# Patient Record
Sex: MALE | Race: ASIAN | Employment: UNEMPLOYED | URBAN - METROPOLITAN AREA
[De-identification: names, ages, dates, MRNs, and addresses within clinical notes are randomized per-mention and may not be internally consistent; named-entity substitution may affect disease eponyms.]

---

## 2020-12-28 PROBLEM — I63.9 STROKE (HCC): Status: ACTIVE | Noted: 2020-08-22

## 2020-12-28 PROBLEM — I65.09 OCCLUSION OF VERTEBRAL ARTERY: Status: ACTIVE | Noted: 2020-12-28

## 2021-05-10 ENCOUNTER — APPOINTMENT (OUTPATIENT)
Dept: CT IMAGING | Facility: HOSPITAL | Age: 76
End: 2021-05-10
Attending: EMERGENCY MEDICINE
Payer: MEDICAID

## 2021-05-10 ENCOUNTER — APPOINTMENT (OUTPATIENT)
Dept: GENERAL RADIOLOGY | Facility: HOSPITAL | Age: 76
End: 2021-05-10
Attending: EMERGENCY MEDICINE
Payer: MEDICAID

## 2021-05-10 ENCOUNTER — HOSPITAL ENCOUNTER (EMERGENCY)
Facility: HOSPITAL | Age: 76
Discharge: HOME | End: 2021-05-10
Attending: EMERGENCY MEDICINE
Payer: MEDICAID

## 2021-05-10 VITALS
SYSTOLIC BLOOD PRESSURE: 147 MMHG | OXYGEN SATURATION: 97 % | DIASTOLIC BLOOD PRESSURE: 84 MMHG | HEART RATE: 80 BPM | RESPIRATION RATE: 17 BRPM

## 2021-05-10 DIAGNOSIS — R73.9 HYPERGLYCEMIA: Primary | ICD-10-CM

## 2021-05-10 PROCEDURE — 99285 EMERGENCY DEPT VISIT HI MDM: CPT

## 2021-05-10 PROCEDURE — 99284 EMERGENCY DEPT VISIT MOD MDM: CPT

## 2021-05-10 PROCEDURE — 70450 CT HEAD/BRAIN W/O DYE: CPT | Performed by: EMERGENCY MEDICINE

## 2021-05-10 PROCEDURE — 82077 ASSAY SPEC XCP UR&BREATH IA: CPT | Performed by: EMERGENCY MEDICINE

## 2021-05-10 PROCEDURE — 93010 ELECTROCARDIOGRAM REPORT: CPT

## 2021-05-10 PROCEDURE — 80143 DRUG ASSAY ACETAMINOPHEN: CPT | Performed by: EMERGENCY MEDICINE

## 2021-05-10 PROCEDURE — 93005 ELECTROCARDIOGRAM TRACING: CPT

## 2021-05-10 PROCEDURE — 85730 THROMBOPLASTIN TIME PARTIAL: CPT | Performed by: EMERGENCY MEDICINE

## 2021-05-10 PROCEDURE — 85025 COMPLETE CBC W/AUTO DIFF WBC: CPT | Performed by: EMERGENCY MEDICINE

## 2021-05-10 PROCEDURE — 80179 DRUG ASSAY SALICYLATE: CPT | Performed by: EMERGENCY MEDICINE

## 2021-05-10 PROCEDURE — 82962 GLUCOSE BLOOD TEST: CPT

## 2021-05-10 PROCEDURE — 96360 HYDRATION IV INFUSION INIT: CPT

## 2021-05-10 PROCEDURE — 85610 PROTHROMBIN TIME: CPT | Performed by: EMERGENCY MEDICINE

## 2021-05-10 PROCEDURE — 80053 COMPREHEN METABOLIC PANEL: CPT | Performed by: EMERGENCY MEDICINE

## 2021-05-10 RX ORDER — INSULIN ASPART 100 [IU]/ML
0.15 INJECTION, SOLUTION INTRAVENOUS; SUBCUTANEOUS ONCE
Status: COMPLETED | OUTPATIENT
Start: 2021-05-10 | End: 2021-05-10

## 2021-05-10 NOTE — ED PROVIDER NOTES
Patient Seen in: [facility] Emergency Department      History   Patient presents with:  Altered Mental Status    Stated Complaint: AMS    HPI/Subjective:   HPI    35-year-old [place] male here for altered mental status the patient was found wandering Physical Exam    Patient is alert he is oriented x2. No acute distress  HEENT exam there is some right-sided facial droop. Speech is somewhat slurred but again this might be partially due to his accent.   The pupils are equal round react light ext components:    HGB 12.9 (*)     HCT 38.8 (*)     Neutrophil Absolute Prelim 8.65 (*)     Neutrophil Absolute 8.65 (*)     Lymphocyte Absolute 0.98 (*)     All other components within normal limits   PROTHROMBIN TIME (PT) - Normal   RAPID SARS-COV-2 BY PCR 6:57 PM

## 2021-05-10 NOTE — ED INITIAL ASSESSMENT (HPI)
Pt was found wondering down [facility]. Pt was picked up by a  and taken to the [address] where they assessed him and stated that he was slurring his speech and had the smell of ETOH. PT denies any ETOH.

## 2021-07-29 PROBLEM — H93.299 ABNORMAL AUDITORY PERCEPTION, UNSPECIFIED LATERALITY: Status: ACTIVE | Noted: 2021-07-29

## 2021-07-29 PROBLEM — H61.23 IMPACTED CERUMEN, BILATERAL: Status: ACTIVE | Noted: 2021-07-29

## 2024-08-12 ENCOUNTER — APPOINTMENT (OUTPATIENT)
Dept: CT IMAGING | Facility: HOSPITAL | Age: 79
End: 2024-08-12
Attending: EMERGENCY MEDICINE
Payer: MEDICAID

## 2024-08-12 ENCOUNTER — APPOINTMENT (OUTPATIENT)
Dept: GENERAL RADIOLOGY | Facility: HOSPITAL | Age: 79
End: 2024-08-12
Attending: EMERGENCY MEDICINE
Payer: MEDICAID

## 2024-08-12 ENCOUNTER — HOSPITAL ENCOUNTER (INPATIENT)
Facility: HOSPITAL | Age: 79
LOS: 4 days | Discharge: NURSING, REHAB OR LONG-TERM CARE | End: 2024-08-16
Attending: EMERGENCY MEDICINE | Admitting: INTERNAL MEDICINE
Payer: MEDICAID

## 2024-08-12 DIAGNOSIS — E87.20 LACTIC ACIDOSIS: ICD-10-CM

## 2024-08-12 DIAGNOSIS — R19.7 DIARRHEA, UNSPECIFIED TYPE: ICD-10-CM

## 2024-08-12 DIAGNOSIS — S06.5XAA ACUTE SUBDURAL HEMATOMA (HCC): ICD-10-CM

## 2024-08-12 DIAGNOSIS — I60.9 SUBARACHNOID HEMORRHAGE (HCC): Primary | ICD-10-CM

## 2024-08-12 DIAGNOSIS — R00.0 SINUS TACHYCARDIA: ICD-10-CM

## 2024-08-12 DIAGNOSIS — R73.9 HYPERGLYCEMIA: ICD-10-CM

## 2024-08-12 PROBLEM — E78.5 HYPERLIPIDEMIA: Status: ACTIVE | Noted: 2024-08-12

## 2024-08-12 PROBLEM — I69.328 HEMIPARESIS AND SPEECH AND LANGUAGE DEFICIT AS LATE EFFECT OF CEREBROVASCULAR ACCIDENT (CVA) (HCC): Status: ACTIVE | Noted: 2024-08-12

## 2024-08-12 PROBLEM — E11.9 TYPE 2 DIABETES MELLITUS WITHOUT COMPLICATION, WITHOUT LONG-TERM CURRENT USE OF INSULIN (HCC): Status: ACTIVE | Noted: 2024-08-12

## 2024-08-12 PROBLEM — I69.359 HEMIPARESIS AND SPEECH AND LANGUAGE DEFICIT AS LATE EFFECT OF CEREBROVASCULAR ACCIDENT (CVA) (HCC): Status: ACTIVE | Noted: 2024-08-12

## 2024-08-12 LAB
ALBUMIN SERPL-MCNC: 5.4 G/DL (ref 3.2–4.8)
ALBUMIN/GLOB SERPL: 1.6 {RATIO} (ref 1–2)
ALP LIVER SERPL-CCNC: 60 U/L
ALT SERPL-CCNC: 13 U/L
ANION GAP SERPL CALC-SCNC: 11 MMOL/L (ref 0–18)
APTT PPP: 23.7 SECONDS (ref 23–36)
AST SERPL-CCNC: 15 U/L (ref ?–34)
ATRIAL RATE: 121 BPM
BASE EXCESS BLDV CALC-SCNC: 1 MMOL/L
BASOPHILS # BLD AUTO: 0.03 X10(3) UL (ref 0–0.2)
BASOPHILS NFR BLD AUTO: 0.2 %
BILIRUB SERPL-MCNC: 1.2 MG/DL (ref 0.2–1.1)
BILIRUB UR QL STRIP.AUTO: NEGATIVE
BUN BLD-MCNC: 23 MG/DL (ref 9–23)
CALCIUM BLD-MCNC: 11.8 MG/DL (ref 8.7–10.4)
CHLORIDE SERPL-SCNC: 107 MMOL/L (ref 98–112)
CHOLEST SERPL-MCNC: 241 MG/DL (ref ?–200)
CLARITY UR REFRACT.AUTO: CLEAR
CO2 SERPL-SCNC: 25 MMOL/L (ref 21–32)
CREAT BLD-MCNC: 1.61 MG/DL
EGFRCR SERPLBLD CKD-EPI 2021: 43 ML/MIN/1.73M2 (ref 60–?)
EOSINOPHIL # BLD AUTO: 0 X10(3) UL (ref 0–0.7)
EOSINOPHIL NFR BLD AUTO: 0 %
ERYTHROCYTE [DISTWIDTH] IN BLOOD BY AUTOMATED COUNT: 12.3 %
GLOBULIN PLAS-MCNC: 3.4 G/DL (ref 2–3.5)
GLUCOSE BLD-MCNC: 263 MG/DL (ref 70–99)
GLUCOSE BLD-MCNC: 349 MG/DL (ref 70–99)
GLUCOSE BLD-MCNC: 364 MG/DL (ref 70–99)
GLUCOSE UR STRIP.AUTO-MCNC: NORMAL MG/DL
HCO3 BLDV-SCNC: 25.4 MEQ/L (ref 22–26)
HCT VFR BLD AUTO: 51.6 %
HDLC SERPL-MCNC: 74 MG/DL (ref 40–59)
HGB BLD-MCNC: 15.9 G/DL
IMM GRANULOCYTES # BLD AUTO: 0.04 X10(3) UL (ref 0–1)
IMM GRANULOCYTES NFR BLD: 0.3 %
INR BLD: 1.01 (ref 0.8–1.2)
KETONES UR STRIP.AUTO-MCNC: NEGATIVE MG/DL
LACTATE BLD-SCNC: 4 MMOL/L (ref 0.5–2)
LDLC SERPL CALC-MCNC: 147 MG/DL (ref ?–100)
LEUKOCYTE ESTERASE UR QL STRIP.AUTO: NEGATIVE
LYMPHOCYTES # BLD AUTO: 0.33 X10(3) UL (ref 1–4)
LYMPHOCYTES NFR BLD AUTO: 2.1 %
MAGNESIUM SERPL-MCNC: 2.2 MG/DL (ref 1.6–2.6)
MCH RBC QN AUTO: 32.6 PG (ref 26–34)
MCHC RBC AUTO-ENTMCNC: 30.8 G/DL (ref 31–37)
MCV RBC AUTO: 106 FL
MONOCYTES # BLD AUTO: 0.72 X10(3) UL (ref 0.1–1)
MONOCYTES NFR BLD AUTO: 4.5 %
NEUTROPHILS # BLD AUTO: 14.79 X10 (3) UL (ref 1.5–7.7)
NEUTROPHILS # BLD AUTO: 14.79 X10(3) UL (ref 1.5–7.7)
NEUTROPHILS NFR BLD AUTO: 92.9 %
NITRITE UR QL STRIP.AUTO: NEGATIVE
NONHDLC SERPL-MCNC: 167 MG/DL (ref ?–130)
NT-PROBNP SERPL-MCNC: 3801 PG/ML (ref ?–450)
OSMOLALITY SERPL CALC.SUM OF ELEC: 314 MOSM/KG (ref 275–295)
OXYHGB MFR BLDV: 84.2 % (ref 72–78)
P AXIS: 46 DEGREES
P-R INTERVAL: 160 MS
PCO2 BLDV: 38 MM HG (ref 38–50)
PH BLDV: 7.43 [PH] (ref 7.33–7.43)
PH UR STRIP.AUTO: 5.5 [PH] (ref 5–8)
PLATELET # BLD AUTO: 252 10(3)UL (ref 150–450)
PO2 BLDV: 54 MM HG (ref 30–50)
POTASSIUM SERPL-SCNC: 4.1 MMOL/L (ref 3.5–5.1)
PROT SERPL-MCNC: 8.8 G/DL (ref 5.7–8.2)
PROT UR STRIP.AUTO-MCNC: NEGATIVE MG/DL
PROTHROMBIN TIME: 13.3 SECONDS (ref 11.6–14.8)
Q-T INTERVAL: 308 MS
QRS DURATION: 104 MS
QTC CALCULATION (BEZET): 437 MS
R AXIS: -45 DEGREES
RBC # BLD AUTO: 4.87 X10(6)UL
RBC UR QL AUTO: NEGATIVE
SARS-COV-2 RNA RESP QL NAA+PROBE: NOT DETECTED
SODIUM SERPL-SCNC: 143 MMOL/L (ref 136–145)
SP GR UR STRIP.AUTO: 1.03 (ref 1–1.03)
T AXIS: 106 DEGREES
TRIGL SERPL-MCNC: 117 MG/DL (ref 30–149)
TROPONIN I SERPL HS-MCNC: 62 NG/L
TSI SER-ACNC: 1.52 MIU/ML (ref 0.55–4.78)
UROBILINOGEN UR STRIP.AUTO-MCNC: NORMAL MG/DL
VENTRICULAR RATE: 121 BPM
VLDLC SERPL CALC-MCNC: 22 MG/DL (ref 0–30)
WBC # BLD AUTO: 15.9 X10(3) UL (ref 4–11)

## 2024-08-12 PROCEDURE — 74177 CT ABD & PELVIS W/CONTRAST: CPT | Performed by: EMERGENCY MEDICINE

## 2024-08-12 PROCEDURE — 70450 CT HEAD/BRAIN W/O DYE: CPT | Performed by: EMERGENCY MEDICINE

## 2024-08-12 PROCEDURE — 70498 CT ANGIOGRAPHY NECK: CPT | Performed by: EMERGENCY MEDICINE

## 2024-08-12 PROCEDURE — 71045 X-RAY EXAM CHEST 1 VIEW: CPT | Performed by: EMERGENCY MEDICINE

## 2024-08-12 PROCEDURE — 99291 CRITICAL CARE FIRST HOUR: CPT | Performed by: NURSE PRACTITIONER

## 2024-08-12 PROCEDURE — 70496 CT ANGIOGRAPHY HEAD: CPT | Performed by: EMERGENCY MEDICINE

## 2024-08-12 RX ORDER — DEXTROSE MONOHYDRATE 25 G/50ML
50 INJECTION, SOLUTION INTRAVENOUS
Status: DISCONTINUED | OUTPATIENT
Start: 2024-08-12 | End: 2024-08-16

## 2024-08-12 RX ORDER — SODIUM CHLORIDE 9 MG/ML
INJECTION, SOLUTION INTRAVENOUS CONTINUOUS
Status: DISCONTINUED | OUTPATIENT
Start: 2024-08-12 | End: 2024-08-13

## 2024-08-12 RX ORDER — METOCLOPRAMIDE HYDROCHLORIDE 5 MG/ML
10 INJECTION INTRAMUSCULAR; INTRAVENOUS EVERY 8 HOURS PRN
Status: DISCONTINUED | OUTPATIENT
Start: 2024-08-12 | End: 2024-08-12

## 2024-08-12 RX ORDER — LABETALOL HYDROCHLORIDE 5 MG/ML
10 INJECTION, SOLUTION INTRAVENOUS ONCE
Status: COMPLETED | OUTPATIENT
Start: 2024-08-12 | End: 2024-08-12

## 2024-08-12 RX ORDER — ONDANSETRON 2 MG/ML
4 INJECTION INTRAMUSCULAR; INTRAVENOUS EVERY 6 HOURS PRN
Status: DISCONTINUED | OUTPATIENT
Start: 2024-08-12 | End: 2024-08-12

## 2024-08-12 RX ORDER — HYDRALAZINE HYDROCHLORIDE 20 MG/ML
10 INJECTION INTRAMUSCULAR; INTRAVENOUS EVERY 2 HOUR PRN
Status: DISCONTINUED | OUTPATIENT
Start: 2024-08-12 | End: 2024-08-13

## 2024-08-12 RX ORDER — ACETAMINOPHEN 650 MG/1
650 SUPPOSITORY RECTAL EVERY 4 HOURS PRN
Status: DISCONTINUED | OUTPATIENT
Start: 2024-08-12 | End: 2024-08-13

## 2024-08-12 RX ORDER — LOSARTAN POTASSIUM 50 MG/1
50 TABLET ORAL DAILY
Status: ON HOLD | COMMUNITY
End: 2024-08-16

## 2024-08-12 RX ORDER — ONDANSETRON 2 MG/ML
4 INJECTION INTRAMUSCULAR; INTRAVENOUS EVERY 6 HOURS PRN
Status: DISCONTINUED | OUTPATIENT
Start: 2024-08-12 | End: 2024-08-13

## 2024-08-12 RX ORDER — MORPHINE SULFATE 4 MG/ML
4 INJECTION, SOLUTION INTRAMUSCULAR; INTRAVENOUS EVERY 2 HOUR PRN
Status: DISCONTINUED | OUTPATIENT
Start: 2024-08-12 | End: 2024-08-16

## 2024-08-12 RX ORDER — MORPHINE SULFATE 2 MG/ML
2 INJECTION, SOLUTION INTRAMUSCULAR; INTRAVENOUS EVERY 2 HOUR PRN
Status: DISCONTINUED | OUTPATIENT
Start: 2024-08-12 | End: 2024-08-16

## 2024-08-12 RX ORDER — HYDROCODONE BITARTRATE AND ACETAMINOPHEN 5; 325 MG/1; MG/1
1 TABLET ORAL EVERY 4 HOURS PRN
Status: DISCONTINUED | OUTPATIENT
Start: 2024-08-12 | End: 2024-08-13

## 2024-08-12 RX ORDER — ACETAMINOPHEN 325 MG/1
650 TABLET ORAL EVERY 4 HOURS PRN
Status: DISCONTINUED | OUTPATIENT
Start: 2024-08-12 | End: 2024-08-13

## 2024-08-12 RX ORDER — LABETALOL HYDROCHLORIDE 5 MG/ML
10 INJECTION, SOLUTION INTRAVENOUS EVERY 10 MIN PRN
Status: DISCONTINUED | OUTPATIENT
Start: 2024-08-12 | End: 2024-08-13

## 2024-08-12 RX ORDER — NICOTINE POLACRILEX 4 MG
30 LOZENGE BUCCAL
Status: DISCONTINUED | OUTPATIENT
Start: 2024-08-12 | End: 2024-08-16

## 2024-08-12 RX ORDER — NICOTINE POLACRILEX 4 MG
15 LOZENGE BUCCAL
Status: DISCONTINUED | OUTPATIENT
Start: 2024-08-12 | End: 2024-08-16

## 2024-08-12 RX ORDER — DONEPEZIL HYDROCHLORIDE 10 MG/1
10 TABLET, FILM COATED ORAL NIGHTLY
COMMUNITY

## 2024-08-12 RX ORDER — METOCLOPRAMIDE HYDROCHLORIDE 5 MG/ML
5 INJECTION INTRAMUSCULAR; INTRAVENOUS EVERY 8 HOURS PRN
Status: DISCONTINUED | OUTPATIENT
Start: 2024-08-12 | End: 2024-08-13

## 2024-08-12 RX ORDER — MORPHINE SULFATE 2 MG/ML
1 INJECTION, SOLUTION INTRAMUSCULAR; INTRAVENOUS EVERY 2 HOUR PRN
Status: DISCONTINUED | OUTPATIENT
Start: 2024-08-12 | End: 2024-08-16

## 2024-08-12 RX ORDER — CLOPIDOGREL BISULFATE 75 MG/1
75 TABLET ORAL
COMMUNITY
End: 2024-08-16

## 2024-08-12 RX ORDER — HYDROCODONE BITARTRATE AND ACETAMINOPHEN 5; 325 MG/1; MG/1
2 TABLET ORAL EVERY 4 HOURS PRN
Status: DISCONTINUED | OUTPATIENT
Start: 2024-08-12 | End: 2024-08-13

## 2024-08-12 RX ORDER — LEVETIRACETAM 500 MG/5ML
500 INJECTION, SOLUTION, CONCENTRATE INTRAVENOUS EVERY 12 HOURS
Status: DISCONTINUED | OUTPATIENT
Start: 2024-08-12 | End: 2024-08-14

## 2024-08-12 NOTE — ED QUICK NOTES
Pt family states that pt right facial droop and weakness is due to prior stroke. Family states that this morning, pt seemed more weak than usual and did not want to even try to get out of bed. Family states that pt was speaking normally and answering questions normally. Currently, family states that pt continually has normal mentation and cognition at this time. They state that pt seems more alert after fluids. This RN has not attempted to ambulate pt due to family stating weakness.

## 2024-08-12 NOTE — ED INITIAL ASSESSMENT (HPI)
Pt presents to ED via EMS from home with c/o increased weakness. Pt has hx of prior stroke and has left side deficit of weakness. Per Ems, family stated that pt can usually walk with assist, but not today. Family state that pt is answering questions correctly, but is more weak than usual.

## 2024-08-12 NOTE — ED PROVIDER NOTES
Patient Seen in: [facility] Emergency Department      History     Chief Complaint   Patient presents with    Fatigue     Increased weakness for the last hour     Stated Complaint: weakness    Subjective:   79-year-old male, history of type 2 diabetes, on metformin, history of CVA with left-sided weakness, presents with multiple family members with complaints of diarrhea and weakness and hyperglycemia and some lethargy.  Wife states that he usually cheats at night and gets up and goes and is a bunch of sweets.  States he then usually has diarrhea in the morning which not unusual.  States he usually sleeps throughout most of the morning he did that today when he tried to get out of bed he had some diarrhea and could not stand on his own.  They said he was acting fine yesterday, had a late lunch so did not eat dinner and they suspected a gap in the middle wanted to eat some sweets.  Patient has some mild dysarthria at baseline.  They state he is at his baseline.  He is oriented to person place time and situation.  He denies any pain.  He is tachycardic upon arrival.  Denies any headache or blurred vision or dizziness or neck pain or chest pain or cough or shortness of breath.  No abdominal pain, back pain, GI bleeding, numbness or acute focal weakness.  Not on any blood thinners.  No falls or trauma.  Very poor historian so most information obtained from his family            Objective:   No pertinent past medical history.            No pertinent past surgical history.              No pertinent social history.            Review of Systems   Unable to perform ROS: Dementia       Positive for stated Chief Complaint: Fatigue (Increased weakness for the last hour)    Other systems are as noted in HPI.  Constitutional and vital signs reviewed.      All other systems reviewed and negative except as noted above.    Physical Exam     ED Triage Vitals [08/12/24 1553]   BP (!) 156/95   Pulse (!) 132   Resp 23   Temp 97.6  °F (36.4 °C)   Temp src Oral   SpO2 95 %   O2 Device None (Room air)       Current Vitals:   Vital Signs  BP: 155/85  Pulse: 87  Resp: 25  Temp: 97.4 °F (36.3 °C)  Temp src: Temporal  MAP (mmHg): (!) 105    Oxygen Therapy  SpO2: 98 %  O2 Device: None (Room air)            Physical Exam  Vitals and nursing note reviewed.   Constitutional:       General: He is not in acute distress.     Appearance: He is not diaphoretic.   HENT:      Head: Normocephalic.      Nose: Nose normal.      Mouth/Throat:      Mouth: Mucous membranes are moist.   Eyes:      Pupils: Pupils are equal, round, and reactive to light.   Cardiovascular:      Rate and Rhythm: Regular rhythm. Tachycardia present.      Pulses: Normal pulses.   Pulmonary:      Effort: Pulmonary effort is normal. No respiratory distress.      Breath sounds: Normal breath sounds.   Abdominal:      General: There is no distension.      Palpations: Abdomen is soft.      Tenderness: There is no abdominal tenderness.   Musculoskeletal:      Cervical back: Neck supple.   Skin:     General: Skin is warm and dry.   Neurological:      General: No focal deficit present.      Mental Status: He is alert.               ED Course     Labs Reviewed   COMP METABOLIC PANEL (14) - Abnormal; Notable for the following components:       Result Value    Glucose 364 (*)     Creatinine 1.61 (*)     Calcium, Total 11.8 (*)     Calculated Osmolality 314 (*)     eGFR-Cr 43 (*)     Bilirubin, Total 1.2 (*)     Total Protein 8.8 (*)     Albumin 5.4 (*)     All other components within normal limits   CBC WITH DIFFERENTIAL WITH PLATELET - Abnormal; Notable for the following components:    WBC 15.9 (*)     .0 (*)     MCHC 30.8 (*)     Neutrophil Absolute Prelim 14.79 (*)     Neutrophil Absolute 14.79 (*)     Lymphocyte Absolute 0.33 (*)     All other components within normal limits   TROPONIN I HIGH SENSITIVITY - Abnormal; Notable for the following components:    Troponin I (High Sensitivity) 62  (*)     All other components within normal limits   PRO BETA NATRIURETIC PEPTIDE - Abnormal; Notable for the following components:    Pro-Beta Natriuretic Peptide 3,801 (*)     All other components within normal limits   VENOUS BLOOD GAS - Abnormal; Notable for the following components:    Venous pO2 54 (*)     Venous O2Hb 84.2 (*)     All other components within normal limits   LACTIC ACID RESPIRATORY - Abnormal; Notable for the following components:    Lactic Acid (Blood Gas) 4.0 (*)     All other components within normal limits   LIPID PANEL - Abnormal; Notable for the following components:    Cholesterol, Total 241 (*)     HDL Cholesterol 74 (*)     LDL Cholesterol 147 (*)     Non HDL Chol 167 (*)     All other components within normal limits   POCT GLUCOSE - Abnormal; Notable for the following components:    POC Glucose 349 (*)     All other components within normal limits   POCT GLUCOSE - Abnormal; Notable for the following components:    POC Glucose 263 (*)     All other components within normal limits   PROTHROMBIN TIME (PT) - Normal   MAGNESIUM - Normal   TSH W REFLEX TO FREE T4 - Normal   PTT, ACTIVATED - Normal   RAPID SARS-COV-2 BY PCR - Normal   URINALYSIS WITH CULTURE REFLEX   RAINBOW DRAW LAVENDER   RAINBOW DRAW LIGHT GREEN   RAINBOW DRAW BLUE   RAINBOW DRAW GOLD   BLOOD CULTURE   BLOOD CULTURE   MRSA SCREEN BY PCR     EKG    Rate, intervals and axes as noted on EKG Report.  Rate: 121  Rhythm: Sinus Rhythm  Reading: EKG sinus tachycardia 121 bpm.  Left axis deviation.  No ST elevations.  , ,  ms.  When compared to May 2021, no significant changes are noted.  He is tachycardic today.  I do not appreciate the deep inverted T waves that were there previously in lead III.    Patient placed on cardiac monitor for telemetry monitoring secondary to weakness, diarrhea, tachcyardia. Interpretation at bedside by me is sinus tachycardia.                ED Course as of 08/12/24  2230  ------------------------------------------------------------  Time: 08/12 1822  Comment: Spoke with neurosurgery Dr Guthrie, states get CTA now, admit to CNICU and then rpt CT brain at 0500 tomorrow              MDM      CTA BRAIN + CTA CAROTIDS (CPT=70496/03059)    Result Date: 8/12/2024  CONCLUSION:   1. Stable mild to moderate scattered subarachnoid hemorrhage most pronounced along the anterior inferior right frontal sulci, left parietal sulci, and minimally along the right sylvian fissure.   2. Stable thin subdural hematoma along the anterior right frontal lobe measuring up to 2 mm in thickness.  3. Stable mild chronic microvascular ischemic changes in the cerebral white matter.  If there is clinical concern for acute ischemia/infarction, an MRI of the brain would be recommended for further evaluation.  4. There is moderate atherosclerotic irregularity and narrowing in the M1 and M2 branches of the right middle cerebral artery.  There is mild-to-moderate atherosclerotic irregularity and narrowing in the distal M1 and proximal M2 branches of the left middle cerebral artery.   5. There is moderate atherosclerotic irregularity and narrowing at the origin of the right vertebral artery due to mixed atherosclerotic plaque.  There is scattered mild plaque along the course of the right cervical vertebral artery with approximately 50% narrowing in the distal V2 segment noted.  There is chronic appearing occlusion of the V3 segment extending into the V4 segment of the right vertebral artery.  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   6. There is severe atherosclerotic narrowing in the V1 segment of the left vertebral artery due to mixed predominately noncalcified atherosclerotic plaque.  There is mild-to-moderate scattered atherosclerotic plaque along the course of the V4 segment of  the left vertebral artery without hemodynamically significant narrowing.  7. There is approximately 50% stenosis in the distal supraclinoid segment of the right internal carotid artery due to mixed plaque.   8. There is ectasia of the ascending aorta measuring up to 4 cm in diameter which could be further assessed with gated CTA of the thoracic aorta as clinically directed.  9. No evidence of occlusion, dissection, or flow-limiting stenosis in the cervical carotid arteries. No evidence of hemodynamically significant carotid stenosis by NASCET criteria.  Please see above for further details.  LOCATION:  [id number]   Dictated by (CST): Joseph Domínguez MD on 8/12/2024 at 8:06 PM     Finalized by (CST): Joseph Domínguez MD on 8/12/2024 at 8:21 PM       CT ABDOMEN+PELVIS(CONTRAST ONLY)(CPT=74177)    Result Date: 8/12/2024  CONCLUSION:  1. Specific etiology for infection and lactic acidosis is not detected on this study. 2. Severe aortic atherosclerosis with plaque ulceration is noted.  There is no specific stenosis detected. 3. Density in the dependent part of the gallbladder could represent noncalcified cholelithiasis or gallbladder sludge. 4. Bilateral nonobstructing nephrolithiasis. 5. Dependent bladder calcifications are noted. 6. Reticular densities with possible traction bronchiectasis in lower lungs could represent interstitial lung disease or be in part related to dependent atelectasis.  If indicated follow-up high-resolution chest CT would be more specific.    LOCATION:  [facility]   Dictated by ([facility]): Saleem Harrison MD on 8/12/2024 at 7:38 PM     Finalized by (CST): Saleem Harrison MD on 8/12/2024 at 7:43 PM       CT BRAIN OR HEAD (CPT=70450)    Result Date: 8/12/2024  CONCLUSION:   1. There is mild to moderate scattered subarachnoid hemorrhage most pronounced along the anterior inferior right frontal sulci, left parietal sulci, and minimally along the right sylvian fissure.  CTA of the head is suggested for further  evaluation.  2. Thin subdural hematoma along the anterior right frontal lobe measuring up to 2 mm in thickness.  3. Stable mild chronic microvascular ischemic changes in the cerebral white matter.  If there is clinical concern for acute ischemia/infarction, an MRI of the brain would be recommended for further evaluation.  Critical results were discussed with Dr. Torres at 1733 hours on 8/12/2024. Critical results were read back.  LOCATION:  [id number]   Dictated by (CST): Joseph Domínguez MD on 8/12/2024 at 5:30 PM     Finalized by (CST): Joseph Domínguez MD on 8/12/2024 at 5:36 PM       XR CHEST AP PORTABLE  (CPT=71045)    Result Date: 8/12/2024  CONCLUSION:  No lobar pneumonia or overt congestive failure.  Mild scarring/atelectasis in the lower lungs.   LOCATION:  [id number]      Dictated by (CST): Joseph Domínguez MD on 8/12/2024 at 5:00 PM     Finalized by (CST): Joseph Domínguez MD on 8/12/2024 at 5:01 PM          I independently interpreted the CT the brain and note the scattered subarachnoid hemorrhage    Differential diagnosis includes, but not limited to, bacterial infection, hypoglycemia, dehydration, viral syndrome, DKA, electrolyte disturbance, trauma    Family at bedside helpful to provide information on the history presenting illness    External chart review demonstrates internal medicine visit in May of this year with duly PCP    CRITICAL CARE:  A total of 105 minutes of critical care time (exclusive of billable procedures) was administered to manage the patient's critical lab values, critical imaging findings, cardiovascular instability, neurologic instability, and metabolic instability due to his subarachnoid and subdural hemorrhages, altered mental status, tachycardia with lactic acidosis suspicious for infection.  Discussion with neurointensivist, neurosurgeon, hospitalist, patient, multiple family members.  Chart review, dietitian, imaging reviewed interpretation.  Frequent neurochecks.  Frequent telemetry  monitoring secondary to sinus tachycardia.  Evaluation for infection.  Managing patient in ED until ICU bed becomes available.  This involved direct patient intervention, complex decision making, and/or extensive discussions with the patient, family, and clinical staff.    79-year-old male presents with some generalized weakness and slight confusion today.  Limited based upon his language barrier as well as baseline dementia even with  and family present.  Does have scattered subarachnoid and some subdural hemorrhages on CT.  No obvious signs of trauma.  No reports of falls though he does get up in the middle the night to get sweets because of his diabetes.  He was hyperglycemic and tachycardic upon arrival.  Lactic acidosis.  Unsure of the etiology.  Could be the underlying tachycardia but that etiology is unclear as well.  Heart rate improved with fluid hydration.  Got a sepsis bolus.  No clear signs of infection.  No obvious pneumonia.  No obvious UTI.  When he went back to CT for his angiogram of his head and his neck after the initial CT findings, I added on his abdomen pelvis to rule out significant infection and the cause of lactic acidosis which is not clear.  Had some small amount of diarrhea in the past couple of days.  Family states is not unusual when he is hyperglycemic.  Currently resting in no distress.  Blood pressure and heart are stable.  Admitted to see NICU.  Neurosurgery questing repeat head CT at 5 AM.  Also discussed with neurointensivist.  Recommend we get neuro IR on board.  Paged, no callback as of yet.  CTA was reviewed, no signs of obvious aneurysm or dissection.  Multiple incidental findings.  Family is updated.  Awaiting bed assignment          Admission disposition: 8/12/2024  8:47 PM                                     [facility]   part of [facility]      Sepsis Reassessment Note    /85 (BP Location: Left arm)   Pulse 87   Temp 97.4 °F (36.3 °C) (Temporal)    Resp 25   Wt 59.8 kg   SpO2 98%   BMI 19.47 kg/m²      I completed the sepsis reassessment at 2030    Cardiac:  Regularity: Regular  Rate: Tachycardic  Heart Sounds: S1,S2    Lungs:   Right: Clear  Left: Clear    Peripheral Pulses:  Radial: Right 1+ or Left 1+      Capillary Refill:  <3 Secs    Skin:  Temp/Moisture: Warm and Dry  Color: Normal      Sam Torres DO  8/12/2024  10:25 PM      Medical Decision Making      Disposition and Plan     Clinical Impression:  1. Subarachnoid hemorrhage (HCC)    2. Acute subdural hematoma (HCC)    3. Diarrhea, unspecified type    4. Hyperglycemia    5. Lactic acidosis    6. Sinus tachycardia         Disposition:  Admit  8/12/2024  8:47 pm    Follow-up:  No follow-up provider specified.        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

## 2024-08-13 ENCOUNTER — APPOINTMENT (OUTPATIENT)
Dept: MRI IMAGING | Facility: HOSPITAL | Age: 79
End: 2024-08-13
Attending: NURSE PRACTITIONER
Payer: MEDICAID

## 2024-08-13 ENCOUNTER — APPOINTMENT (OUTPATIENT)
Dept: CT IMAGING | Facility: HOSPITAL | Age: 79
End: 2024-08-13
Attending: NURSE PRACTITIONER
Payer: MEDICAID

## 2024-08-13 LAB
ANION GAP SERPL CALC-SCNC: 10 MMOL/L (ref 0–18)
BASOPHILS # BLD AUTO: 0.02 X10(3) UL (ref 0–0.2)
BASOPHILS NFR BLD AUTO: 0.1 %
BUN BLD-MCNC: 15 MG/DL (ref 9–23)
CALCIUM BLD-MCNC: 10.4 MG/DL (ref 8.7–10.4)
CHLORIDE SERPL-SCNC: 119 MMOL/L (ref 98–112)
CO2 SERPL-SCNC: 22 MMOL/L (ref 21–32)
CREAT BLD-MCNC: 1.17 MG/DL
EGFRCR SERPLBLD CKD-EPI 2021: 63 ML/MIN/1.73M2 (ref 60–?)
EOSINOPHIL # BLD AUTO: 0 X10(3) UL (ref 0–0.7)
EOSINOPHIL NFR BLD AUTO: 0 %
ERYTHROCYTE [DISTWIDTH] IN BLOOD BY AUTOMATED COUNT: 12.6 %
GLUCOSE BLD-MCNC: 181 MG/DL (ref 70–99)
GLUCOSE BLD-MCNC: 198 MG/DL (ref 70–99)
GLUCOSE BLD-MCNC: 207 MG/DL (ref 70–99)
GLUCOSE BLD-MCNC: 246 MG/DL (ref 70–99)
HCT VFR BLD AUTO: 44.4 %
HGB BLD-MCNC: 14.2 G/DL
IMM GRANULOCYTES # BLD AUTO: 0.05 X10(3) UL (ref 0–1)
IMM GRANULOCYTES NFR BLD: 0.3 %
LYMPHOCYTES # BLD AUTO: 0.56 X10(3) UL (ref 1–4)
LYMPHOCYTES NFR BLD AUTO: 3.5 %
MCH RBC QN AUTO: 32.2 PG (ref 26–34)
MCHC RBC AUTO-ENTMCNC: 32 G/DL (ref 31–37)
MCV RBC AUTO: 100.7 FL
MONOCYTES # BLD AUTO: 0.61 X10(3) UL (ref 0.1–1)
MONOCYTES NFR BLD AUTO: 3.8 %
MRSA DNA SPEC QL NAA+PROBE: NEGATIVE
NEUTROPHILS # BLD AUTO: 14.9 X10 (3) UL (ref 1.5–7.7)
NEUTROPHILS # BLD AUTO: 14.9 X10(3) UL (ref 1.5–7.7)
NEUTROPHILS NFR BLD AUTO: 92.3 %
OSMOLALITY SERPL CALC.SUM OF ELEC: 319 MOSM/KG (ref 275–295)
PLATELET # BLD AUTO: 199 10(3)UL (ref 150–450)
POTASSIUM SERPL-SCNC: 4 MMOL/L (ref 3.5–5.1)
RBC # BLD AUTO: 4.41 X10(6)UL
SODIUM SERPL-SCNC: 151 MMOL/L (ref 136–145)
WBC # BLD AUTO: 16.1 X10(3) UL (ref 4–11)

## 2024-08-13 PROCEDURE — 99292 CRITICAL CARE ADDL 30 MIN: CPT | Performed by: INTERNAL MEDICINE

## 2024-08-13 PROCEDURE — 70450 CT HEAD/BRAIN W/O DYE: CPT | Performed by: NURSE PRACTITIONER

## 2024-08-13 PROCEDURE — 70546 MR ANGIOGRAPH HEAD W/O&W/DYE: CPT | Performed by: NURSE PRACTITIONER

## 2024-08-13 PROCEDURE — 99291 CRITICAL CARE FIRST HOUR: CPT | Performed by: NEUROLOGICAL SURGERY

## 2024-08-13 PROCEDURE — 70553 MRI BRAIN STEM W/O & W/DYE: CPT | Performed by: NURSE PRACTITIONER

## 2024-08-13 PROCEDURE — 99291 CRITICAL CARE FIRST HOUR: CPT | Performed by: INTERNAL MEDICINE

## 2024-08-13 RX ORDER — DONEPEZIL HYDROCHLORIDE 10 MG/1
10 TABLET, FILM COATED ORAL NIGHTLY
Status: DISCONTINUED | OUTPATIENT
Start: 2024-08-13 | End: 2024-08-16

## 2024-08-13 RX ORDER — ATORVASTATIN CALCIUM 20 MG/1
20 TABLET, FILM COATED ORAL NIGHTLY
Status: DISCONTINUED | OUTPATIENT
Start: 2024-08-13 | End: 2024-08-16

## 2024-08-13 RX ORDER — ONDANSETRON 2 MG/ML
4 INJECTION INTRAMUSCULAR; INTRAVENOUS EVERY 6 HOURS PRN
Status: DISCONTINUED | OUTPATIENT
Start: 2024-08-13 | End: 2024-08-16

## 2024-08-13 RX ORDER — MIDAZOLAM HYDROCHLORIDE 1 MG/ML
2 INJECTION INTRAMUSCULAR; INTRAVENOUS
Status: DISCONTINUED | OUTPATIENT
Start: 2024-08-13 | End: 2024-08-16

## 2024-08-13 RX ORDER — LABETALOL HYDROCHLORIDE 5 MG/ML
10 INJECTION, SOLUTION INTRAVENOUS EVERY 10 MIN PRN
Status: DISCONTINUED | OUTPATIENT
Start: 2024-08-13 | End: 2024-08-16

## 2024-08-13 RX ORDER — ACETAMINOPHEN 325 MG/1
650 TABLET ORAL EVERY 4 HOURS PRN
Status: DISCONTINUED | OUTPATIENT
Start: 2024-08-13 | End: 2024-08-16

## 2024-08-13 RX ORDER — METOCLOPRAMIDE HYDROCHLORIDE 5 MG/ML
5 INJECTION INTRAMUSCULAR; INTRAVENOUS EVERY 8 HOURS PRN
Status: DISCONTINUED | OUTPATIENT
Start: 2024-08-13 | End: 2024-08-16

## 2024-08-13 RX ORDER — GADOTERATE MEGLUMINE 376.9 MG/ML
15 INJECTION INTRAVENOUS
Status: COMPLETED | OUTPATIENT
Start: 2024-08-13 | End: 2024-08-13

## 2024-08-13 RX ORDER — ACETAMINOPHEN 650 MG/1
650 SUPPOSITORY RECTAL EVERY 4 HOURS PRN
Status: DISCONTINUED | OUTPATIENT
Start: 2024-08-13 | End: 2024-08-16

## 2024-08-13 RX ORDER — NIMODIPINE 30 MG/1
60 CAPSULE, LIQUID FILLED ORAL
Status: DISCONTINUED | OUTPATIENT
Start: 2024-08-13 | End: 2024-08-14

## 2024-08-13 RX ORDER — HYDRALAZINE HYDROCHLORIDE 20 MG/ML
10 INJECTION INTRAMUSCULAR; INTRAVENOUS EVERY 2 HOUR PRN
Status: DISCONTINUED | OUTPATIENT
Start: 2024-08-13 | End: 2024-08-15

## 2024-08-13 NOTE — CONSULTS
[facility]  Neurocritical Care Consult Note    Raymond Smith Patient Status:  Inpatient    3/28/1945 MRN [id number]   Location [facility] 6NE-A Attending Stevan Pizarro, DO   Hosp Day # 1 PCP Lisa Wetzel MD       Reason for Consultation:   sah    HPI:   Patient is a 79 year old male with a h/o htn, hl, dm2, stroke w/residual L HP p/w sah . Pt was noted by family to have generalized weakness thus brought to ED where w/u revealed ct head with R frontal and L parietal scattered sah and R frontal sdh, cta neg for vasc malformation, and pts family denies any recent head trauma, thus pt was transferred to [facility] for further monitoring.     Past Medical History:    CVA (cerebral vascular accident) (HCC)    Diabetes (HCC)    Essential hypertension    High blood pressure    High cholesterol       Past Surgical History:   Procedure Laterality Date    Other surgical history      feeding tube       Medications Prior to Admission   Medication Sig Dispense Refill Last Dose    donepezil 10 MG Oral Tab Take 1 tablet (10 mg total) by mouth nightly.   Past Week    losartan 50 MG Oral Tab Take 1 tablet (50 mg total) by mouth daily.   Past Month    metFORMIN 500 MG Oral Tab Take 1 tablet (500 mg total) by mouth in the morning, at noon, and at bedtime. (Patient taking differently: Take 1 tablet (500 mg total) by mouth in the morning, at noon, and at bedtime. 2 tabs (Metformin 1000 mg every evening)) 270 tablet 3 2024    clopidogrel 75 MG Oral Tab Take 1 tablet (75 mg total) by mouth.   More than a month    erythromycin 5 MG/GM Ophthalmic Ointment Apply a small amount to both eye before bedtime       ONETOUCH ULTRA In Vitro Strip 1 each by Other route 3 (three) times daily. 400 each 3     atorvastatin 20 MG Oral Tab Take 1 tablet (20 mg total) by mouth daily. 90 tablet 3 More than a month    Blood Glucose Monitoring Suppl (ONETOUCH ULTRA 2) w/Device Does not apply Kit 1 lancet by Finger  stick route 3 (three) times daily.       Lancets (ONETOUCH DELICA PLUS [id number]) Does not apply Misc 1 strip by In Vitro route 3 (three) times daily.        No Known Allergies  Social History     Socioeconomic History    Marital status:    Tobacco Use    Smoking status: Never    Smokeless tobacco: Never   Vaping Use    Vaping status: Never Used   Substance and Sexual Activity    Alcohol use: Never    Drug use: Never     History reviewed. No pertinent family history.    Current Meds:  Current Facility-Administered Medications   Medication Dose Route Frequency    atorvastatin (Lipitor) tab 20 mg  20 mg Oral Nightly    donepezil (Aricept) tab 10 mg  10 mg Oral Nightly    acetaminophen (Tylenol) tab 650 mg  650 mg Oral Q4H PRN    Or    acetaminophen (Tylenol) rectal suppository 650 mg  650 mg Rectal Q4H PRN    labetalol (Trandate) 5 mg/mL injection 10 mg  10 mg Intravenous Q10 Min PRN    hydrALAzine (Apresoline) 20 mg/mL injection 10 mg  10 mg Intravenous Q2H PRN    ondansetron (Zofran) 4 MG/2ML injection 4 mg  4 mg Intravenous Q6H PRN    levETIRAcetam (Keppra) 500 mg/5mL injection 500 mg  500 mg Intravenous Q12H    metoclopramide (Reglan) 5 mg/mL injection 5 mg  5 mg Intravenous Q8H PRN    morphINE PF 2 MG/ML injection 1 mg  1 mg Intravenous Q2H PRN    Or    morphINE PF 2 MG/ML injection 2 mg  2 mg Intravenous Q2H PRN    Or    morphINE PF 4 MG/ML injection 4 mg  4 mg Intravenous Q2H PRN    acetaminophen (Tylenol) tab 650 mg  650 mg Oral Q4H PRN    Or    HYDROcodone-acetaminophen (Norco) 5-325 MG per tab 1 tablet  1 tablet Oral Q4H PRN    Or    HYDROcodone-acetaminophen (Norco) 5-325 MG per tab 2 tablet  2 tablet Oral Q4H PRN    glucose (Dex4) 15 GM/59ML oral liquid 15 g  15 g Oral Q15 Min PRN    Or    glucose (Glutose) 40% oral gel 15 g  15 g Oral Q15 Min PRN    Or    glucose-vitamin C (Dex-4) chewable tab 4 tablet  4 tablet Oral Q15 Min PRN    Or    dextrose 50% injection 50 mL  50 mL Intravenous Q15 Min PRN     Or    glucose (Dex4) 15 GM/59ML oral liquid 30 g  30 g Oral Q15 Min PRN    Or    glucose (Glutose) 40% oral gel 30 g  30 g Oral Q15 Min PRN    Or    glucose-vitamin C (Dex-4) chewable tab 8 tablet  8 tablet Oral Q15 Min PRN    insulin aspart (NovoLOG) 100 Units/mL FlexPen 1-5 Units  1-5 Units Subcutaneous TID AC and HS       Review of Systems:     Constitutional:    Denies unusual weight loss or weight gain, fever/chills or night sweats.  HEENT:                Denies changes in vision or difficulty swallowing.  Pulm:                    Denies dyspnea, cough, or sputum production  Cardiac:               Denies chest pain, palpitations or lower extremity edema.  GI:                         Denies constipation, heartburn or melena.  :                       Denies dysuria or hematuria.  Skin:                     Denies rashes or open areas.  Neuro:                  As per HPI    All other systems were reviewed and are negative.    Vitals:   Temp:  [97.3 °F (36.3 °C)-98.6 °F (37 °C)] 98.6 °F (37 °C)  Pulse:  [] 111  Resp:  [17-26] 25  BP: (113-189)/(48-98) 126/68  SpO2:  [95 %-100 %] 100 %  Body mass index is 19.47 kg/m².    Intake/Output:    Intake/Output Summary (Last 24 hours) at 8/13/2024 1239  Last data filed at 8/13/2024 0644  Gross per 24 hour   Intake 1684.2 ml   Output 250 ml   Net 1434.2 ml       Physical Examination:   General- No acute distress  CV- RRR  Resp- CTA Bilat  Neuro-  Mental status- lethargic but arousable to stim, briefly regards and follows commands  Cranial nerves- pupils equally round and reactive to light, extraocular muscles intact, +baseline R facial droop  Motor- noriega x4  Sens-  Intact to light touch    Diagnostics:   CT BRAIN OR HEAD (CPT=70450)    Result Date: 8/13/2024  CONCLUSION:   1.  Stable subarachnoid hemorrhage along the right frontal lobe, right sylvian fissure, and left parietal lobe.  2. Stable 2 mm right anterior frontal subdural hemorrhage.    LOCATION:  [facility]    Dictated by (CST): Zoltan Rowe MD on 8/13/2024 at 5:45 AM     Finalized by (CST): Zoltan Rowe MD on 8/13/2024 at 5:48 AM       CTA BRAIN + CTA CAROTIDS (CPT=70496/72368)    Result Date: 8/12/2024  CONCLUSION:   1. Stable mild to moderate scattered subarachnoid hemorrhage most pronounced along the anterior inferior right frontal sulci, left parietal sulci, and minimally along the right sylvian fissure.   2. Stable thin subdural hematoma along the anterior right frontal lobe measuring up to 2 mm in thickness.  3. Stable mild chronic microvascular ischemic changes in the cerebral white matter.  If there is clinical concern for acute ischemia/infarction, an MRI of the brain would be recommended for further evaluation.  4. There is moderate atherosclerotic irregularity and narrowing in the M1 and M2 branches of the right middle cerebral artery.  There is mild-to-moderate atherosclerotic irregularity and narrowing in the distal M1 and proximal M2 branches of the left middle cerebral artery.   5. There is moderate atherosclerotic irregularity and narrowing at the origin of the right vertebral artery due to mixed atherosclerotic plaque.  There is scattered mild plaque along the course of the right cervical vertebral artery with approximately 50% narrowing in the distal V2 segment noted.  There is chronic appearing occlusion of the V3 segment extending into the V4 segment of the right vertebral artery.  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   6. There is severe atherosclerotic narrowing in the V1 segment of the left vertebral artery due to mixed predominately noncalcified atherosclerotic plaque.  There is mild-to-moderate scattered atherosclerotic plaque along the course of the V4 segment of the left vertebral artery without hemodynamically significant narrowing.  7. There is  approximately 50% stenosis in the distal supraclinoid segment of the right internal carotid artery due to mixed plaque.   8. There is ectasia of the ascending aorta measuring up to 4 cm in diameter which could be further assessed with gated CTA of the thoracic aorta as clinically directed.  9. No evidence of occlusion, dissection, or flow-limiting stenosis in the cervical carotid arteries. No evidence of hemodynamically significant carotid stenosis by NASCET criteria.  Please see above for further details.  LOCATION:  [id number]   Dictated by (CST): Joseph Domínguez MD on 8/12/2024 at 8:06 PM     Finalized by (CST): Joseph Domínguez MD on 8/12/2024 at 8:21 PM       CT ABDOMEN+PELVIS(CONTRAST ONLY)(CPT=74177)    Result Date: 8/12/2024  CONCLUSION:  1. Specific etiology for infection and lactic acidosis is not detected on this study. 2. Severe aortic atherosclerosis with plaque ulceration is noted.  There is no specific stenosis detected. 3. Density in the dependent part of the gallbladder could represent noncalcified cholelithiasis or gallbladder sludge. 4. Bilateral nonobstructing nephrolithiasis. 5. Dependent bladder calcifications are noted. 6. Reticular densities with possible traction bronchiectasis in lower lungs could represent interstitial lung disease or be in part related to dependent atelectasis.  If indicated follow-up high-resolution chest CT would be more specific.    LOCATION:  [facility]   Dictated by (CST): Saleem Harrison MD on 8/12/2024 at 7:38 PM     Finalized by (CST): Saleem Harrison MD on 8/12/2024 at 7:43 PM       CT BRAIN OR HEAD (CPT=70450)    Result Date: 8/12/2024  CONCLUSION:   1. There is mild to moderate scattered subarachnoid hemorrhage most pronounced along the anterior inferior right frontal sulci, left parietal sulci, and minimally along the right sylvian fissure.  CTA of the head is suggested for further evaluation.  2. Thin subdural hematoma along the anterior right frontal lobe measuring up to  2 mm in thickness.  3. Stable mild chronic microvascular ischemic changes in the cerebral white matter.  If there is clinical concern for acute ischemia/infarction, an MRI of the brain would be recommended for further evaluation.  Critical results were discussed with Dr. Torres at 1733 hours on 8/12/2024. Critical results were read back.  LOCATION:  [id number]   Dictated by (CST): Joseph Domínguez MD on 8/12/2024 at 5:30 PM     Finalized by (CST): Joseph Domínguez MD on 8/12/2024 at 5:36 PM       XR CHEST AP PORTABLE  (CPT=71045)    Result Date: 8/12/2024  CONCLUSION:  No lobar pneumonia or overt congestive failure.  Mild scarring/atelectasis in the lower lungs.   LOCATION:  [id number]      Dictated by (CST): Joseph Domínguez MD on 8/12/2024 at 5:00 PM     Finalized by (CST): Joseph Domínguez MD on 8/12/2024 at 5:01 PM        Lab Review     Recent Labs   Lab 08/12/24  1604 08/13/24  0439    151*   K 4.1 4.0    119*   CO2 25.0 22.0   * 207*   BUN 23 15   CREATSERUM 1.61* 1.17     Recent Labs   Lab 08/12/24  1604 08/13/24  0439   WBC 15.9* 16.1*   HGB 15.9 14.2   .0 199.0       Assesment/Plan:     Neuro:  SAH- differential includes spontaneous vs iatrogenic on antiplt vs aneurysmal vs traumatic.   Family denies any recent head trauma.   CTA neg for vasc malformation, will check mri/a for further eval.   Cont sah protocol with neurochecks, keppra, nimotop and TCDs  H/o stroke w/residual R HP- hold antiplt 2/2 above until cleared per Neurosurg.   CTA with diffuse intracranial athero.   Cardiac:  Htn/hl- sbp goal 100-140, cont statin  Pulmonary:  Stable on RA  Renal:  IVFs, monitor BUN/Cr  GI:  PO as tolerated  Heme/ID:  Afebrile, trend leukocytosis  Endocrine/Rheum:  Monitor glucose, sliding scale insulin prn  DVT Prophylaxis:  SCD’s    Goals of the Day: neurochecks, mri/a, sah protocol   A total of 80 minutes of critical care time (exclusive of billable procedures) was administered to manage and/or  prevent neurologic instability. This involved direct patient intervention, complex decision making, and/or extensive discussions with the patient, family, and clinical staff.    Thank you for allowing me to participate in the care of this patient.     Hao Mosqueda MD, [facility]  Medical Director of System Neurosciences  Chief, Section of Neurocritical Care  [facility]

## 2024-08-13 NOTE — CONSULTS
[facility]  Neurosurgery Consult    Raymond Smith Patient Status:  Inpatient    3/28/1945 MRN [id number]   Location [facility] 6NE-A Attending Stevan Pizarro, DO   Hosp Day # 1 PCP Lisa Wetzel MD     REASON FOR CONSULTATION:  SAH, SDH    HISTORY OF PRESENT ILLNESS:  Raymond Smith is a(n) 79 year old male with a PMH of CVA with residual right side weakness, DM, HTN, and HLD who presented to the ED with weakness and not being able to get out of bed. CT head was completed which shows SAH and right acute SDH. No recent falls per family. CTA H/N was negative for any vascular etiology. He takes Plavix at home.     Currently, patient is resting in bed. Wakens to verbal stim per son in law in Danish. The patient is oriented x3, but lethargic and son in law assists with history since patient is a poor historian.    PAST MEDICAL HISTORY:  Past Medical History:    CVA (cerebral vascular accident) (HCC)    Diabetes (HCC)    Essential hypertension    High blood pressure    High cholesterol       PAST SURGICAL HISTORY:  Past Surgical History:   Procedure Laterality Date    Other surgical history      feeding tube       FAMILY HISTORY:  family history is not on file.    SOCIAL HISTORY:   reports that he has never smoked. He has never used smokeless tobacco. He reports that he does not drink alcohol and does not use drugs.    ALLERGIES:  No Known Allergies    MEDICATIONS:  Medications Prior to Admission   Medication Sig Dispense Refill Last Dose    donepezil 10 MG Oral Tab Take 1 tablet (10 mg total) by mouth nightly.   Past Week    losartan 50 MG Oral Tab Take 1 tablet (50 mg total) by mouth daily.   Past Month    metFORMIN 500 MG Oral Tab Take 1 tablet (500 mg total) by mouth in the morning, at noon, and at bedtime. (Patient taking differently: Take 1 tablet (500 mg total) by mouth in the morning, at noon, and at bedtime. 2 tabs (Metformin 1000 mg every evening)) 270 tablet 3 2024    clopidogrel 75  MG Oral Tab Take 1 tablet (75 mg total) by mouth.   More than a month    erythromycin 5 MG/GM Ophthalmic Ointment Apply a small amount to both eye before bedtime       ONETOUCH ULTRA In Vitro Strip 1 each by Other route 3 (three) times daily. 400 each 3     atorvastatin 20 MG Oral Tab Take 1 tablet (20 mg total) by mouth daily. 90 tablet 3 More than a month    Blood Glucose Monitoring Suppl (ONETOUCH ULTRA 2) w/Device Does not apply Kit 1 lancet by Finger stick route 3 (three) times daily.       Lancets (ONETOUCH DELICA PLUS [id number]) Does not apply Misc 1 strip by In Vitro route 3 (three) times daily.       hydrochlorothiazide 12.5 MG Oral Tab Take 1 tablet by mouth daily. (Patient not taking: Reported on 1/13/2022)   Unknown     Current Facility-Administered Medications   Medication Dose Route Frequency    acetaminophen (Tylenol) tab 650 mg  650 mg Oral Q4H PRN    Or    acetaminophen (Tylenol) rectal suppository 650 mg  650 mg Rectal Q4H PRN    labetalol (Trandate) 5 mg/mL injection 10 mg  10 mg Intravenous Q10 Min PRN    hydrALAzine (Apresoline) 20 mg/mL injection 10 mg  10 mg Intravenous Q2H PRN    ondansetron (Zofran) 4 MG/2ML injection 4 mg  4 mg Intravenous Q6H PRN    levETIRAcetam (Keppra) 500 mg/5mL injection 500 mg  500 mg Intravenous Q12H    metoclopramide (Reglan) 5 mg/mL injection 5 mg  5 mg Intravenous Q8H PRN    morphINE PF 2 MG/ML injection 1 mg  1 mg Intravenous Q2H PRN    Or    morphINE PF 2 MG/ML injection 2 mg  2 mg Intravenous Q2H PRN    Or    morphINE PF 4 MG/ML injection 4 mg  4 mg Intravenous Q2H PRN    acetaminophen (Tylenol) tab 650 mg  650 mg Oral Q4H PRN    Or    HYDROcodone-acetaminophen (Norco) 5-325 MG per tab 1 tablet  1 tablet Oral Q4H PRN    Or    HYDROcodone-acetaminophen (Norco) 5-325 MG per tab 2 tablet  2 tablet Oral Q4H PRN    glucose (Dex4) 15 GM/59ML oral liquid 15 g  15 g Oral Q15 Min PRN    Or    glucose (Glutose) 40% oral gel 15 g  15 g Oral Q15 Min PRN    Or     glucose-vitamin C (Dex-4) chewable tab 4 tablet  4 tablet Oral Q15 Min PRN    Or    dextrose 50% injection 50 mL  50 mL Intravenous Q15 Min PRN    Or    glucose (Dex4) 15 GM/59ML oral liquid 30 g  30 g Oral Q15 Min PRN    Or    glucose (Glutose) 40% oral gel 30 g  30 g Oral Q15 Min PRN    Or    glucose-vitamin C (Dex-4) chewable tab 8 tablet  8 tablet Oral Q15 Min PRN    insulin aspart (NovoLOG) 100 Units/mL FlexPen 1-5 Units  1-5 Units Subcutaneous TID AC and HS       REVIEW OF SYSTEMS:  A 10-point system was reviewed.  Pertinent positives and negatives are noted in HPI.      PHYSICAL EXAMINATION:  VITAL SIGNS: /82 (BP Location: Left arm)   Pulse 96   Temp 98.4 °F (36.9 °C) (Temporal)   Resp 19   Wt 131 lb 13.4 oz (59.8 kg)   SpO2 98%   BMI 19.47 kg/m²   GENERAL:  Patient is a 79 year old male in no acute distress.  HEENT:  Normocephalic, atraumatic.  NEUROLOGICAL:  This patient is alert and orientated x 3.  Speech fluent. Comprehension intact.   PERRLA +3 brisk,  EOMI.  Right facial droop CN's GI.  Sensation to light touch is intact bilaterally.  No Pronator Drift.  Moving all extremities antigravity.  Gait deferred.         DIAGNOSTIC DATA:   Lab Results   Component Value Date    WBC 16.1 08/13/2024    HGB 14.2 08/13/2024    HCT 44.4 08/13/2024    .0 08/13/2024    CREATSERUM 1.17 08/13/2024    BUN 15 08/13/2024     08/13/2024    K 4.0 08/13/2024     08/13/2024    CO2 22.0 08/13/2024     08/13/2024    CA 10.4 08/13/2024    ALB 5.4 08/12/2024    ALKPHO 60 08/12/2024    BILT 1.2 08/12/2024    TP 8.8 08/12/2024    AST 15 08/12/2024    ALT 13 08/12/2024    PTT 23.7 08/12/2024    INR 1.01 08/12/2024    PTP 13.3 08/12/2024    TSH 1.525 08/12/2024    MG 2.2 08/12/2024    PGLU 198 08/13/2024       IMAGING:  CT BRAIN OR HEAD (CPT=70450)    Result Date: 8/13/2024  CONCLUSION:   1.  Stable subarachnoid hemorrhage along the right frontal lobe, right sylvian fissure, and left parietal  lobe.  2. Stable 2 mm right anterior frontal subdural hemorrhage.    LOCATION:  [facility]   Dictated by (CST): Zoltan Rowe MD on 8/13/2024 at 5:45 AM     Finalized by (CST): Zoltan Rowe MD on 8/13/2024 at 5:48 AM       CTA BRAIN + CTA CAROTIDS (CPT=70496/75169)    Result Date: 8/12/2024  CONCLUSION:   1. Stable mild to moderate scattered subarachnoid hemorrhage most pronounced along the anterior inferior right frontal sulci, left parietal sulci, and minimally along the right sylvian fissure.   2. Stable thin subdural hematoma along the anterior right frontal lobe measuring up to 2 mm in thickness.  3. Stable mild chronic microvascular ischemic changes in the cerebral white matter.  If there is clinical concern for acute ischemia/infarction, an MRI of the brain would be recommended for further evaluation.  4. There is moderate atherosclerotic irregularity and narrowing in the M1 and M2 branches of the right middle cerebral artery.  There is mild-to-moderate atherosclerotic irregularity and narrowing in the distal M1 and proximal M2 branches of the left middle cerebral artery.   5. There is moderate atherosclerotic irregularity and narrowing at the origin of the right vertebral artery due to mixed atherosclerotic plaque.  There is scattered mild plaque along the course of the right cervical vertebral artery with approximately 50% narrowing in the distal V2 segment noted.  There is chronic appearing occlusion of the V3 segment extending into the V4 segment of the right vertebral artery.  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   6. There is severe atherosclerotic narrowing in the V1 segment of the left vertebral artery due to mixed predominately noncalcified atherosclerotic plaque.  There is mild-to-moderate scattered atherosclerotic plaque along the course of the V4 segment of the  left vertebral artery without hemodynamically significant narrowing.  7. There is approximately 50% stenosis in the distal supraclinoid segment of the right internal carotid artery due to mixed plaque.   8. There is ectasia of the ascending aorta measuring up to 4 cm in diameter which could be further assessed with gated CTA of the thoracic aorta as clinically directed.  9. No evidence of occlusion, dissection, or flow-limiting stenosis in the cervical carotid arteries. No evidence of hemodynamically significant carotid stenosis by NASCET criteria.  Please see above for further details.  LOCATION:  [id number]   Dictated by (CST): Joseph Domínguez MD on 8/12/2024 at 8:06 PM     Finalized by (CST): Joseph Domínguez MD on 8/12/2024 at 8:21 PM       CT ABDOMEN+PELVIS(CONTRAST ONLY)(CPT=74177)    Result Date: 8/12/2024  CONCLUSION:  1. Specific etiology for infection and lactic acidosis is not detected on this study. 2. Severe aortic atherosclerosis with plaque ulceration is noted.  There is no specific stenosis detected. 3. Density in the dependent part of the gallbladder could represent noncalcified cholelithiasis or gallbladder sludge. 4. Bilateral nonobstructing nephrolithiasis. 5. Dependent bladder calcifications are noted. 6. Reticular densities with possible traction bronchiectasis in lower lungs could represent interstitial lung disease or be in part related to dependent atelectasis.  If indicated follow-up high-resolution chest CT would be more specific.    LOCATION:  [facility]   Dictated by (CST): Saleem Harrison MD on 8/12/2024 at 7:38 PM     Finalized by (CST): Saleem Harrison MD on 8/12/2024 at 7:43 PM       CT BRAIN OR HEAD (CPT=70450)    Result Date: 8/12/2024  CONCLUSION:   1. There is mild to moderate scattered subarachnoid hemorrhage most pronounced along the anterior inferior right frontal sulci, left parietal sulci, and minimally along the right sylvian fissure.  CTA of the head is suggested for further  evaluation.  2. Thin subdural hematoma along the anterior right frontal lobe measuring up to 2 mm in thickness.  3. Stable mild chronic microvascular ischemic changes in the cerebral white matter.  If there is clinical concern for acute ischemia/infarction, an MRI of the brain would be recommended for further evaluation.  Critical results were discussed with Dr. Torres at 1733 hours on 8/12/2024. Critical results were read back.  LOCATION:  [id number]   Dictated by (CST): Joseph Domínguez MD on 8/12/2024 at 5:30 PM     Finalized by (CST): Joseph Domínguez MD on 8/12/2024 at 5:36 PM       XR CHEST AP PORTABLE  (CPT=71045)    Result Date: 8/12/2024  CONCLUSION:  No lobar pneumonia or overt congestive failure.  Mild scarring/atelectasis in the lower lungs.   LOCATION:  [id number]      Dictated by (CST): Joseph Domínguez MD on 8/12/2024 at 5:00 PM     Finalized by (CST): Joseph Domínguez MD on 8/12/2024 at 5:01 PM         ASSESSMENT:  80 yo male with SAH/SDH    Plan:  No surgical intervention at this time  Follow up head CT was stable  No AC or AP  Keppra per [facility]  MRI/MRA to rule out any vascular etiology  DVT: SCDs  Therapy as tolerated      EAN Pruett  [facility]  8/13/2024, 8:38 AM   Spectre [id number]      A total of 60 minutes of visit time (exclusible of billable procedures) was administered.  > 50 % of time spent counseling/coordinating care     Is this a shared or split note between Advanced Practice Provider and Physician? Yes

## 2024-08-13 NOTE — PLAN OF CARE
Patient received aox4, lethargic, resting in bed.   VSS, NSR on monitor, hydralazine given to maintains -150.   SLP saw pt passed swallow eval.  Dr Mosqueda saw pt; started nimotop and ok to get out of bed.  PT/OT worked with patient, able to get into chair, RN/PCTs to use sarastedy to transfer; pt will likely require acute rehab, Edwige notified.   MRI/MRA completed this afternoon; tolerated well by patient.   Per Dr Sosa; depending on results of MRI/MRA, plan for poss angio on Tuesday.   Pt's daughter, son-in-law, and wife at bedside; given updated on poc.     Problem: NEUROLOGICAL - ADULT  Goal: Achieves stable or improved neurological status  Description: INTERVENTIONS  - Assess for and report changes in neurological status  - Initiate measures to prevent increased intracranial pressure  - Maintain blood pressure and fluid volume within ordered parameters to optimize cerebral perfusion and minimize risk of hemorrhage  - Monitor temperature, glucose, and sodium. Initiate appropriate interventions as ordered  Outcome: Progressing  Goal: Absence of seizures  Description: INTERVENTIONS  - Monitor for seizure activity  - Administer anti-seizure medications as ordered  - Monitor neurological status  Outcome: Progressing  Goal: Remains free of injury related to seizure activity  Description: INTERVENTIONS:  - Maintain airway, patient safety  and administer oxygen as ordered  - Monitor patient for seizure activity, document and report duration and description of seizure to MD/LIP  - If seizure occurs, turn patient to side and suction secretions as needed  - Reorient patient post seizure  - Seizure pads on all 4 side rails  - Instruct patient/family to notify RN of any seizure activity  - Instruct patient/family to call for assistance with activity based on assessment  Outcome: Progressing  Goal: Achieves maximal functionality and self care  Description: INTERVENTIONS  - Monitor swallowing and airway patency with  patient fatigue and changes in neurological status  - Encourage and assist patient to increase activity and self care with guidance from PT/OT  - Encourage visually impaired, hearing impaired and aphasic patients to use assistive/communication devices  Outcome: Progressing

## 2024-08-13 NOTE — PLAN OF CARE
2130- Admitted from ED w/ scattered SAH & a small R frontal SDH. He is alert/oriented x 3. Primary language is Vietnamese (understands a little English). With R droop, slurring & abnormal R shrug (all residual from previous stroke). PERRLA, brisk. EOMS intact. Denies headache or visual disturbance. Pls see neuro flow sheet for full neuro exam & hourly neuro checks. Admission Navigator questions answered by daughter at the bedside. Instructed bedrest and NPO w/ speech to evaluate swallowing. Follow up CT Brain scheduled in am. Keppra 500mg/IV given. Dr. Carmona paged & notified regarding hyperglycemia with orders received. Will continue to monitor.  0645- With elevated Na+(151) & serum osmo.(319); has been urinating a lot but unable to measure it because patient ripped off external cath & refused replacement last night. Results relayed to neuro APN. 0.9NS stopped.

## 2024-08-13 NOTE — PHYSICAL THERAPY NOTE
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  Prior to admission, patient's baseline is Mod I.  Patient is requiring moderate assist as a result of the following impairments: decreased endurance/aerobic capacity, pain, impaired Gait and standing balance, and decreased muscular endurance.  Physical Therapy will continue to follow for duration of hospitalization.    Patient will benefit from continued skilled PT Services to facilitate return to prior level of function as patient demonstrates high motivation with excellent tolerance to an intensive therapy program .    PLAN  PT Treatment Plan: Bed mobility;Body mechanics;Endurance;Gait training;Strengthening;Stair training;Transfer training;Balance training  Rehab Potential : Good  Frequency (Obs): 3-5x/week  Number of Visits to Meet Established Goals: 3      CURRENT GOALS    Goal #1 Patient is able to demonstrate supine - sit EOB @ level: minimum assistance     Goal #2 Patient is able to demonstrate transfers EOB to/from BSC at assistance level: minimum assistance     Goal #3 Patient is able to ambulate 50 feet with assist device: walker - rolling at assistance level: minimum assistance     Goal #4    Goal #5    Goal #6    Goal Comments: Goals established on 8/13/2024      PHYSICAL THERAPY MEDICAL/SOCIAL HISTORY  History related to current admission: Patient is a 79 year old male admitted on 8/12/2024 from Home for Subarachnoid Hemorrhage.      HOME SITUATION  Type of Home: Apartment   Home Layout: Elevator                Lives With:  Spouse  Drives: No  Patient Owned Equipment: Rolling walker  Patient Regularly Uses: Glasses    Prior Level of [place]: Pt's Son in law was present in the session. Pt lives with his wife in an apt. Pt was able to perform majority of his ADLs. Pt did not use an assisted device to ambulate. Pt has a hx of CVA, Pt at baseline has a hx of R facial droop and also has a tendency to lean towards the R .     SUBJECTIVE  \"Thank you\"      OBJECTIVE  Precautions: Bed/chair alarm;Seizure (-150)  Fall Risk: High fall risk    WEIGHT BEARING RESTRICTION  Weight Bearing Restriction: None                PAIN ASSESSMENT  Rating: Unable to rate  Location: Pt reports no pain       COGNITION  Overall Cognitive Status:  Impaired  Following Commands:  follows one step commands with increased time and follows one step commands with repetition  Awareness of Errors:  assistance required to identify errors made, assistance required to correct errors made, and decreased awareness of errors   Awareness of Deficits:  decreased awareness of deficits  Problem Solving:  assistance required to identify errors made, assistance required to generate solutions, and assistance required to implement solutions    RANGE OF MOTION AND STRENGTH ASSESSMENT  Upper extremity ROM and strength are within functional limits     Lower extremity ROM is within functional limits     Lower extremity strength is within functional limits       BALANCE  Static Sitting: Fair -  Dynamic Sitting: Poor +  Static Standing: Poor +  Dynamic Standing: Poor +    ADDITIONAL TESTS  Additional Tests: Modified [place]              Modified [place]: 4                  ACTIVITY TOLERANCE                         O2 WALK       NEUROLOGICAL FINDINGS                        AM-PAC '6-Clicks' INPATIENT SHORT FORM - BASIC MOBILITY  How much difficulty does the patient currently have...  Patient Difficulty: Turning over in bed (including adjusting bedclothes, sheets and blankets)?: A  Lot   Patient Difficulty: Sitting down on and standing up from a chair with arms (e.g., wheelchair, bedside commode, etc.): A Lot   Patient Difficulty: Moving from lying on back to sitting on the side of the bed?: A Lot   How much help from another person does the patient currently need...   Help from Another: Moving to and from a bed to a chair (including a wheelchair)?: A Lot   Help from Another: Need to walk in hospital room?: A Lot   Help from Another: Climbing 3-5 steps with a railing?: A Lot       AM-PAC Score:  Raw Score: 12   Approx Degree of Impairment: 68.66%   Standardized Score (AM-PAC Scale): 35.33   CMS Modifier (G-Code): CL    FUNCTIONAL ABILITY STATUS  Gait Assessment   Functional Mobility/Gait Assessment  Gait Assistance: Not tested    Skilled Therapy Provided     Bed Mobility:  Rolling: Min A  Supine to sit: Mod A. Pt required Mod A to bring the UE trunk to sitting position   Sit to supine: NT     Transfer Mobility:  Sit to stand: Mod A   Stand to sit: Min A  Gait = NT    Therapist's Comments: While standing pt required min A while using the RW to maintain standing. When attempting to performing transfer from bed to chair using the RW, pt required constant tactile and verbal cues to safely complete transfer as pt was observed with poor safety awareness. Pt required to be tactile guided using the RW with with mod A to complete transfer. At this time pt is to unsteady to attempt ambulation.     Exercise/Education Provided:  Bed mobility  Body mechanics  Gait training  Strengthening  Transfer training    Patient End of Session: Up in chair;Needs met;Call light within reach;RN aware of session/findings;All patient questions and concerns addressed;Family present      Patient Evaluation Complexity Level:  History Moderate - 1 or 2 personal factors and/or co-morbidities   Examination of body systems Low - addressing 1-2 elements   Clinical Presentation Low - Stable   Clinical Decision Making Low - Stable        PT Session Time: 23 minutes  Therapeutic Activity: 15 minutes

## 2024-08-13 NOTE — ED QUICK NOTES
Orders for admission, patient is aware of plan and ready to go upstairs. Any questions, please call ED RN Tia at extension [phone].     Patient Covid vaccination status: Unvaccinated     COVID Test Ordered in ED: Rapid SARS-CoV-2 by PCR    COVID Suspicion at Admission: N/A    Running Infusions:      Mental Status/LOC at time of transport: A&O x3    Other pertinent information:   CIWA score: N/A   NIH score:  0

## 2024-08-13 NOTE — OCCUPATIONAL THERAPY NOTE
OCCUPATIONAL THERAPY EVALUATION - INPATIENT     Room Number: 6612/6612-A  Evaluation Date: 8/13/2024  Type of Evaluation: Initial  Presenting Problem: Diarrhea, SAH, R frontal SDH    Physician Order: IP Consult to Occupational Therapy  Reason for Therapy: ADL/IADL Dysfunction and Discharge Planning    OCCUPATIONAL THERAPY ASSESSMENT   Patient is currently functioning below baseline with  all ADL . Prior to admission, patient's baseline is independent. Per son in law, pt intermittently had \"little\" assistance with bathing. Ambulated without device.    Patient is requiring moderate assistance as a result of the following impairments: decreased functional strength, decreased endurance, impaired sitting and standing balance, impaired coordination, and impaired motor planning. Occupational Therapy will continue to follow for duration of hospitalization.    Patient will benefit from continued skilled OT Services to facilitate return to prior level of function as patient demonstrates high motivation with excellent tolerance to an intensive therapy program      History Related to Current Admission: Patient is a 79 year old male admitted on 8/12/2024 with Presenting Problem: Diarrhea, SAH, R frontal SDH. 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, per son in law, HTN, DM    WEIGHT BEARING RESTRICTION  Weight Bearing Restriction: None          Recommendations for nursing staff:   Transfers: iris anaidy lift   Toileting location: commode    EVALUATION SESSION:  Patient Start of Session: supine  FUNCTIONAL TRANSFER ASSESSMENT  Sit to Stand: Edge of Bed  Edge of Bed: Moderate Assist    BED MOBILITY  Supine to Sit : Moderate Assist    O2 SATURATIONS  Oxygen Therapy  SPO2% on Room Air at Rest: 97    COGNITION  Following Commands:  follows one step commands with increased time  Initiation: hand over hand to initiate tasks  Motor Planning: impaired  Safety Judgement:  decreased awareness of need for  assistance  Awareness of Errors:  assistance required to identify errors made and assistance required to correct errors made  Awareness of Deficits:  decreased awareness of deficits    Upper Extremity   ROM: within functional limits   Strength: within functional limits   Coordination  Gross motor:   Fine motor: intact    EDUCATION PROVIDED  Patient: Role of Occupational Therapy; Plan of Care; Functional Transfer Techniques; Posture/Positioning  Patient's Response to Education: Requires Further Education    Equipment used: rw     Therapist comments: son in law was present. He mentioned that pt does speak English, but son in law was talking to him in [place].  While in bed with HOB elevated to about 30 degrees, pt was able to flex legs to pull up socks independently. Pt was using both hands equally to manage socks and fold sheets.   Mod A supine to sit.  R sided lean with mod A initially, but after weight shift facilitation forward/back and laterally, static sitting at [facility].  Hand-over-hand guidance for rw placement and sit to stand sequencing. Mod A.  Noted soiled brief.  Min to mod A static standing balance with PT and RW while OT provided total A for hygiene care and brief changes.  Mod A with RW and cueing for pacing to take steps to the chair.  Alarm on.  Pt was able to flex/ext B elbows and flex/ext shoulders equally, seated.           Patient End of Session: Up in chair;Needs met;Call light within reach;RN aware of session/findings;All patient questions and concerns addressed;Alarm set;Family present    OCCUPATIONAL PROFILE    HOME SITUATION  Type of Home: Apartment  Home Layout: Elevator  Lives With: Spouse    Toilet and Equipment: Standard height toilet          Occupation/Status: retired,   Hand Dominance: Right  Drives: No  Patient Regularly Uses: Glasses    Prior Level of Function: independent with ADL, per son in law, intermittently needed \"little\" assist with showering. No device for ambulation. Son  in law mentioned that pt leans to R side sometimes after cva, 5 yrs ago.    SUBJECTIVE   \"Thank you for the socks.\"    PAIN ASSESSMENT  Ratin          OBJECTIVE  Precautions: Bed/chair alarm;Seizure (-150)  Fall Risk: High fall risk      ASSESSMENTS    AM-PAC ‘6-Clicks’ Inpatient Daily Activity Short Form  -   Putting on and taking off regular lower body clothing?: A Lot  -   Bathing (including washing, rinsing, drying)?: A Lot  -   Toileting, which includes using toilet, bedpan or urinal? : A Lot  -   Putting on and taking off regular upper body clothing?: A Little  -   Taking care of personal grooming such as brushing teeth?: A Little  -   Eating meals?: A Little    AM-PAC Score:  Score: 15  Approx Degree of Impairment: 56.46%  Standardized Score (AM-PAC Scale): 34.69    ADDITIONAL TESTS     NEUROLOGICAL FINDINGS  Coordination - Rapid Alternating Movement: Symmetrical     COGNITION ASSESSMENTS       PLAN  OT Treatment Plan: Energy conservation/work simplification techniques;Balance activities;ADL training;UE strengthening/ROM;Functional transfer training;Patient/Family education;Equipment eval/education;Neuromuscluar reeducation;Compensatory technique education;Fine motor coordination activities  Rehab Potential : Good  Frequency: 3-5x/week  Number of Visits to Meet Established Goals: 5    ADL Goals   Patient will perform grooming: with setup  Patient will perform upper body dressing:  with setup  Patient will perform lower body dressing:  with min assist  Patient will perform toileting: with min assist    Functional Transfer Goals  Patient will transfer from supine to sit:  with supervision  Patient will transfer to toilet:  with supervision    UE Exercise Program Goal  Patient will be supervision with bilateral AROM HEP (home exercise program).    Additional Goals  Pt will sit at edge of bed for 2 minutes, sba in order to prepare for seated ADL tasks.    Therapist Goals  PHQ 9  Patient Evaluation  Complexity Level:   Occupational Profile/Medical History LOW - Brief history including review of medical or therapy records    Specific performance deficits impacting engagement in ADL/IADL HIGH  5+ performance deficits    Client Assessment/Performance Deficits HIGH - Comorbidities and significant modifications of tasks    Clinical Decision Making MODERATE - Analysis of occupational profile, detailed assessments, several treatment options    Overall Complexity LOW     OT Session Time: 20 minutes  Self-Care Home Management:  minutes  Therapeutic Activity:  minutes  Neuromuscular Re-education: 10 minutes  Therapeutic Exercise: 2 minutes

## 2024-08-13 NOTE — PAYOR COMM NOTE
--------------  ADMISSION REVIEW     Payor: [facility]  Subscriber #:  [id number]  Authorization Number: [id number]    Admit date: 8/12/24  Admit time:  9:07 PM       REVIEW DOCUMENTATION:     ED Provider Notes        ED Provider Notes signed by Sam Torres DO at 8/12/2024 10:30 PM       Author: Sam Torres DO Service: -- Author Type: Physician    Filed: 8/12/2024 10:30 PM Date of Service: 8/12/2024  4:25 PM Status: Signed    : Sam Torres DO (Physician)           Patient Seen in: [facility] Emergency Department      History     Chief Complaint   Patient presents with    Fatigue     Increased weakness for the last hour     Stated Complaint: weakness    Subjective:   79-year-old male, history of type 2 diabetes, on metformin, history of CVA with left-sided weakness, presents with multiple family members with complaints of diarrhea and weakness and hyperglycemia and some lethargy.  Wife states that he usually cheats at night and gets up and goes and is a bunch of sweets.  States he then usually has diarrhea in the morning which not unusual.  States he usually sleeps throughout most of the morning he did that today when he tried to get out of bed he had some diarrhea and could not stand on his own.  They said he was acting fine yesterday, had a late lunch so did not eat dinner and they suspected a gap in the middle wanted to eat some sweets.  Patient has some mild dysarthria at baseline.  They state he is at his baseline.  He is oriented to person place time and situation.  He denies any pain.  He is tachycardic upon arrival.  Denies any headache or blurred vision or dizziness or neck pain or chest pain or cough or shortness of breath.  No abdominal pain, back pain, GI bleeding, numbness or acute focal weakness.  Not on any blood thinners.  No falls or trauma.  Very poor historian so most information obtained from his family           Review of Systems    Unable to perform ROS: Dementia       Positive for stated Chief Complaint: Fatigue (Increased weakness for the last hour)    Other systems are as noted in HPI.  Constitutional and vital signs reviewed.      All other systems reviewed and negative except as noted above.    Physical Exam     ED Triage Vitals [08/12/24 1553]   BP (!) 156/95   Pulse (!) 132   Resp 23   Temp 97.6 °F (36.4 °C)   Temp src Oral   SpO2 95 %   O2 Device None (Room air)       Current Vitals:   Vital Signs  BP: 155/85  Pulse: 87  Resp: 25  Temp: 97.4 °F (36.3 °C)  Temp src: Temporal  MAP (mmHg): (!) 105    Oxygen Therapy  SpO2: 98 %  O2 Device: None (Room air)        Physical Exam  Vitals and nursing note reviewed.   Constitutional:       General: He is not in acute distress.     Appearance: He is not diaphoretic.   HENT:      Head: Normocephalic.      Nose: Nose normal.      Mouth/Throat:      Mouth: Mucous membranes are moist.   Eyes:      Pupils: Pupils are equal, round, and reactive to light.   Cardiovascular:      Rate and Rhythm: Regular rhythm. Tachycardia present.      Pulses: Normal pulses.   Pulmonary:      Effort: Pulmonary effort is normal. No respiratory distress.      Breath sounds: Normal breath sounds.   Abdominal:      General: There is no distension.      Palpations: Abdomen is soft.      Tenderness: There is no abdominal tenderness.   Musculoskeletal:      Cervical back: Neck supple.   Skin:     General: Skin is warm and dry.   Neurological:      General: No focal deficit present.      Mental Status: He is alert.          ED Course     Labs Reviewed   COMP METABOLIC PANEL (14) - Abnormal; Notable for the following components:       Result Value    Glucose 364 (*)     Creatinine 1.61 (*)     Calcium, Total 11.8 (*)     Calculated Osmolality 314 (*)     eGFR-Cr 43 (*)     Bilirubin, Total 1.2 (*)     Total Protein 8.8 (*)     Albumin 5.4 (*)     All other components within normal limits   CBC WITH DIFFERENTIAL WITH PLATELET -  Abnormal; Notable for the following components:    WBC 15.9 (*)     .0 (*)     MCHC 30.8 (*)     Neutrophil Absolute Prelim 14.79 (*)     Neutrophil Absolute 14.79 (*)     Lymphocyte Absolute 0.33 (*)     All other components within normal limits   TROPONIN I HIGH SENSITIVITY - Abnormal; Notable for the following components:    Troponin I (High Sensitivity) 62 (*)     All other components within normal limits   PRO BETA NATRIURETIC PEPTIDE - Abnormal; Notable for the following components:    Pro-Beta Natriuretic Peptide 3,801 (*)     All other components within normal limits   VENOUS BLOOD GAS - Abnormal; Notable for the following components:    Venous pO2 54 (*)     Venous O2Hb 84.2 (*)     All other components within normal limits   LACTIC ACID RESPIRATORY - Abnormal; Notable for the following components:    Lactic Acid (Blood Gas) 4.0 (*)     All other components within normal limits   LIPID PANEL - Abnormal; Notable for the following components:    Cholesterol, Total 241 (*)     HDL Cholesterol 74 (*)     LDL Cholesterol 147 (*)     Non HDL Chol 167 (*)     All other components within normal limits   POCT GLUCOSE - Abnormal; Notable for the following components:    POC Glucose 349 (*)     All other components within normal limits   POCT GLUCOSE - Abnormal; Notable for the following components:    POC Glucose 263 (*)      EKG    Rate, intervals and axes as noted on EKG Report.  Rate: 121  Rhythm: Sinus Rhythm  Reading: EKG sinus tachycardia 121 bpm.  Left axis deviation.  No ST elevations.  , ,  ms.  When compared to May 2021, no significant changes are noted.  He is tachycardic today.  I do not appreciate the deep inverted T waves that were there previously in lead III.    Patient placed on cardiac monitor for telemetry monitoring secondary to weakness, diarrhea, tachcyardia. Interpretation at bedside by me is sinus tachycardia.           ED Course as of 08/12/24  2230  ------------------------------------------------------------  Time: 08/12 1822  Comment: Spoke with neurosurgery Dr Guthrie, states get CTA now, admit to CNICU and then rpt CT brain at 0500 tomorrow         MDM      CTA BRAIN + CTA CAROTIDS (CPT=70496/47210)    Result Date: 8/12/2024  CONCLUSION:   1. Stable mild to moderate scattered subarachnoid hemorrhage most pronounced along the anterior inferior right frontal sulci, left parietal sulci, and minimally along the right sylvian fissure.   2. Stable thin subdural hematoma along the anterior right frontal lobe measuring up to 2 mm in thickness.  3. Stable mild chronic microvascular ischemic changes in the cerebral white matter.  If there is clinical concern for acute ischemia/infarction, an MRI of the brain would be recommended for further evaluation.  4. There is moderate atherosclerotic irregularity and narrowing in the M1 and M2 branches of the right middle cerebral artery.  There is mild-to-moderate atherosclerotic irregularity and narrowing in the distal M1 and proximal M2 branches of the left middle cerebral artery.   5. There is moderate atherosclerotic irregularity and narrowing at the origin of the right vertebral artery due to mixed atherosclerotic plaque.  There is scattered mild plaque along the course of the right cervical vertebral artery with approximately 50% narrowing in the distal V2 segment noted.  There is chronic appearing occlusion of the V3 segment extending into the V4 segment of the right vertebral artery.  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   6. There is severe atherosclerotic narrowing in the V1 segment of the left vertebral artery due to mixed predominately noncalcified atherosclerotic plaque.  There is mild-to-moderate scattered atherosclerotic plaque along the course of the V4 segment of the  left vertebral artery without hemodynamically significant narrowing.  7. There is approximately 50% stenosis in the distal supraclinoid segment of the right internal carotid artery due to mixed plaque.   8. There is ectasia of the ascending aorta measuring up to 4 cm in diameter which could be further assessed with gated CTA of the thoracic aorta as clinically directed.  9. No evidence of occlusion, dissection, or flow-limiting stenosis in the cervical carotid arteries. No evidence of hemodynamically significant carotid stenosis by NASCET criteria.  Please see above for further details.  LOCATION:  [id number]   Dictated by (CST): Joseph Domínguez MD on 8/12/2024 at 8:06 PM     Finalized by (CST): Joseph Domínguez MD on 8/12/2024 at 8:21 PM       CT ABDOMEN+PELVIS(CONTRAST ONLY)(CPT=74177)    Result Date: 8/12/2024  CONCLUSION:  1. Specific etiology for infection and lactic acidosis is not detected on this study. 2. Severe aortic atherosclerosis with plaque ulceration is noted.  There is no specific stenosis detected. 3. Density in the dependent part of the gallbladder could represent noncalcified cholelithiasis or gallbladder sludge. 4. Bilateral nonobstructing nephrolithiasis. 5. Dependent bladder calcifications are noted. 6. Reticular densities with possible traction bronchiectasis in lower lungs could represent interstitial lung disease or be in part related to dependent atelectasis.  If indicated follow-up high-resolution chest CT would be more specific.    LOCATION:  [facility]   Dictated by (CST): Saleem Harrison MD on 8/12/2024 at 7:38 PM     Finalized by (CST): Saleem Harrison MD on 8/12/2024 at 7:43 PM       CT BRAIN OR HEAD (CPT=70450)    Result Date: 8/12/2024  CONCLUSION:   1. There is mild to moderate scattered subarachnoid hemorrhage most pronounced along the anterior inferior right frontal sulci, left parietal sulci, and minimally along the right sylvian fissure.  CTA of the head is suggested for further  evaluation.  2. Thin subdural hematoma along the anterior right frontal lobe measuring up to 2 mm in thickness.  3. Stable mild chronic microvascular ischemic changes in the cerebral white matter.  If there is clinical concern for acute ischemia/infarction, an MRI of the brain would be recommended for further evaluation.  Critical results were discussed with Dr. Torres at 1733 hours on 8/12/2024. Critical results were read back.  LOCATION:  [id number]   Dictated by (CST): Joseph Domínguez MD on 8/12/2024 at 5:30 PM     Finalized by (CST): Joseph Domínguez MD on 8/12/2024 at 5:36 PM       XR CHEST AP PORTABLE  (CPT=71045)    Result Date: 8/12/2024  CONCLUSION:  No lobar pneumonia or overt congestive failure.  Mild scarring/atelectasis in the lower lungs.   LOCATION:  [id number]      Dictated by (CST): Joseph Domínguez MD on 8/12/2024 at 5:00 PM     Finalized by (CST): Joseph Domínguez MD on 8/12/2024 at 5:01 PM          I independently interpreted the CT the brain and note the scattered subarachnoid hemorrhage    Differential diagnosis includes, but not limited to, bacterial infection, hypoglycemia, dehydration, viral syndrome, DKA, electrolyte disturbance, trauma    Family at bedside helpful to provide information on the history presenting illness    External chart review demonstrates internal medicine visit in May of this year with duly PCP    CRITICAL CARE:  A total of 105 minutes of critical care time (exclusive of billable procedures) was administered to manage the patient's critical lab values, critical imaging findings, cardiovascular instability, neurologic instability, and metabolic instability due to his subarachnoid and subdural hemorrhages, altered mental status, tachycardia with lactic acidosis suspicious for infection.  Discussion with neurointensivist, neurosurgeon, hospitalist, patient, multiple family members.  Chart review, dietitian, imaging reviewed interpretation.  Frequent neurochecks.  Frequent telemetry  monitoring secondary to sinus tachycardia.  Evaluation for infection.  Managing patient in ED until ICU bed becomes available.  This involved direct patient intervention, complex decision making, and/or extensive discussions with the patient, family, and clinical staff.    79-year-old male presents with some generalized weakness and slight confusion today.  Limited based upon his language barrier as well as baseline dementia even with  and family present.  Does have scattered subarachnoid and some subdural hemorrhages on CT.  No obvious signs of trauma.  No reports of falls though he does get up in the middle the night to get sweets because of his diabetes.  He was hyperglycemic and tachycardic upon arrival.  Lactic acidosis.  Unsure of the etiology.  Could be the underlying tachycardia but that etiology is unclear as well.  Heart rate improved with fluid hydration.  Got a sepsis bolus.  No clear signs of infection.  No obvious pneumonia.  No obvious UTI.  When he went back to CT for his angiogram of his head and his neck after the initial CT findings, I added on his abdomen pelvis to rule out significant infection and the cause of lactic acidosis which is not clear.  Had some small amount of diarrhea in the past couple of days.  Family states is not unusual when he is hyperglycemic.  Currently resting in no distress.  Blood pressure and heart are stable.  Admitted to see NICU.  Neurosurgery questing repeat head CT at 5 AM.  Also discussed with neurointensivist.  Recommend we get neuro IR on board.  Paged, no callback as of yet.  CTA was reviewed, no signs of obvious aneurysm or dissection.  Multiple incidental findings.  Family is updated.  Awaiting bed assignment          Admission disposition: 8/12/2024  8:47 PM         [facility]   part of [facility]      Sepsis Reassessment Note    /85 (BP Location: Left arm)   Pulse 87   Temp 97.4 °F (36.3 °C) (Temporal)   Resp 25   Wt 59.8 kg    SpO2 98%   BMI 19.47 kg/m²      I completed the sepsis reassessment at 2030    Cardiac:  Regularity: Regular  Rate: Tachycardic  Heart Sounds: S1,S2    Lungs:   Right: Clear  Left: Clear    Peripheral Pulses:  Radial: Right 1+ or Left 1+      Capillary Refill:  <3 Secs    Skin:  Temp/Moisture: Warm and Dry  Color: Normal      Sam GAMBOA Brian, DO  8/12/2024  10:25 PM      Medical Decision Making      Disposition and Plan     Clinical Impression:  1. Subarachnoid hemorrhage (HCC)    2. Acute subdural hematoma (HCC)    3. Diarrhea, unspecified type    4. Hyperglycemia    5. Lactic acidosis    6. 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) ([facility]) I69.359, I69.328 8/12/2024 Unknown    Hyperglycemia R73.9 8/12/2024 Unknown    Hyperlipidemia E78.5 8/12/2024 Unknown    Lactic acidosis E87.20 8/12/2024 Unknown    Sinus tachycardia R00.0 8/12/2024 Unknown    Type 2 diabetes mellitus without complication, without long-term current use of insulin ([facility]) E11.9 8/12/2024 Unknown             8/12 H&P           Chief Complaint   Patient presents with    Fatigue       Increased weakness for the last hour         PCP: Lisa Wetzel MD        History of Present Illness: Patient is a 79 year old male with PMH sig for CVA, DM II, HTN, and HLD who presented to the ED for evaluation of weakness and diarrhea.  He has baseline residual R hemiparesis, facial droop, and slurred speech since his CVA.  Yesterday he was unable to get out of bed, unable to ambulate.  Thus, he was brought to the ED.  Pt lives independently with  his spouse.  Per his son in law Virgil, pt was in his usual state of health prior to yesterday, able to care for himself, goran any known falls.      In the ED, CT brain showed scattered SAH and thin R acute SDH.  CTA neg for aneurysm.  He was admitted to the NICU for further evaluation and treatment.       On my evaluation, pt responds to commands, no new complaints.  Son in law Herman at bedside.       Past Medical History       Past Medical History:    CVA (cerebral vascular accident) (HCC)    Diabetes (HCC)    Essential hypertension    High blood pressure    High cholesterol         Past Surgical History         Past Surgical History:   Procedure Laterality Date    Other surgical history         feeding tube            ALL:  Allergies   No Known Allergies         Medications Ordered Prior to Encounter   No current facility-administered medications on file prior to encounter.             Current Outpatient Medications on File Prior to Encounter   Medication Sig Dispense Refill    donepezil 10 MG Oral Tab Take 1 tablet (10 mg total) by mouth nightly.        losartan 50 MG Oral Tab Take 1 tablet (50 mg total) by mouth daily.        metFORMIN 500 MG Oral Tab Take 1 tablet (500 mg total) by mouth in the morning, at noon, and at bedtime. (Patient taking differently: Take 1 tablet (500 mg total) by mouth in the morning, at noon, and at bedtime. 2 tabs (Metformin 1000 mg every evening)) 270 tablet 3    clopidogrel 75 MG Oral Tab Take 1 tablet (75 mg total) by mouth.        erythromycin 5 MG/GM Ophthalmic Ointment Apply a small amount to both eye before bedtime        ONETOUCH ULTRA In Vitro Strip 1 each by Other route 3 (three) times daily. 400 each 3    atorvastatin 20 MG Oral Tab Take 1 tablet (20 mg total) by mouth daily. 90 tablet 3    Blood Glucose Monitoring Suppl (ONETOUCH ULTRA 2) w/Device Does not apply Kit 1 lancet by Finger stick route 3 (three) times daily.        Lancets (ONETOUCH DELICA PLUS [id number]) Does  not apply Misc 1 strip by In Vitro route 3 (three) times daily.        hydrochlorothiazide 12.5 MG Oral Tab Take 1 tablet by mouth daily. (Patient not taking: Reported on 1/13/2022)          '        Social History           Tobacco Use    Smoking status: Never    Smokeless tobacco: Never   Substance Use Topics    Alcohol use: Never         Fam Hx  Family History   History reviewed. No pertinent family history.        Review of Systems  Comprehensive ROS reviewed and negative except for what is stated in HPI.       OBJECTIVE:  /81   Pulse 92   Temp 98 °F (36.7 °C) (Temporal)   Resp 25   Wt 131 lb 13.4 oz (59.8 kg)   SpO2 97%   BMI 19.47 kg/m²   Gen: +dysarthria.  Aox 3.  R facial droop.  Responds well to commands.     HEENT:  EOMI, PERRLA, OP clear, MMM  Pulm: Lungs clear bilaterally, normal respiratory effort  CV: Tachy, reg, no murmur.  Normal PMI.    Abd: Abdomen soft, nontender, nondistended, no organomegaly, bowel sounds present  MSK: Full range of motion in extremities, no clubbing, no cyanosis  Skin: no rashes or lesions  Neuro:  RUE/RLE 4/5 strength         Data Review:    LABS:         Lab Results   Component Value Date     WBC 16.1 08/13/2024     HGB 14.2 08/13/2024     HCT 44.4 08/13/2024     .0 08/13/2024     CREATSERUM 1.17 08/13/2024     BUN 15 08/13/2024      08/13/2024     K 4.0 08/13/2024      08/13/2024     CO2 22.0 08/13/2024      08/13/2024     CA 10.4 08/13/2024     ALB 5.4 08/12/2024     ALKPHO 60 08/12/2024     BILT 1.2 08/12/2024     TP 8.8 08/12/2024     AST 15 08/12/2024     ALT 13 08/12/2024     PTT 23.7 08/12/2024     INR 1.01 08/12/2024     PTP 13.3 08/12/2024     TSH 1.525 08/12/2024     MG 2.2 08/12/2024     PGLU 198 08/13/2024         CXR: image personally reviewed.       Radiology: CT BRAIN OR HEAD (CPT=70450)     Result Date: 8/13/2024  PROCEDURE:  CT BRAIN OR HEAD (60218)  COMPARISON:  [facility] , CT, CTA BRAIN + CTA CAROTIDS (CPT=70496/00392),  8/12/2024, 7:38 PM.  [facility] , CT, CT BRAIN OR HEAD (14489), 8/12/2024, 5:10 PM.  [facility] , CT, CT BRAIN OR HEAD (28363), 5/10/2021, 5:24 PM.  INDICATIONS:  F/u SAH  TECHNIQUE:  Noncontrast CT scanning is performed through the brain. Dose reduction techniques were used. Dose information is transmitted to the ACR (American College of Radiology) NRDR (National Radiology Data Registry) which includes the Dose Index Registry.  PATIENT STATED HISTORY: (As transcribed by Technologist)  Follow up SAH    FINDINGS:  VENTRICLES/SULCI:  Ventricles and sulci are normal in size.  INTRACRANIAL:  Subarachnoid hemorrhage noted along the right inferior and lateral frontal lobe and the left high convexity parietal lobe is again noted and stable since previous study.  There is minimal subarachnoid hemorrhage within the right sylvian fissure.  Trace subdural hemorrhage along the anterior right frontal lobe is stable at 2 mm. There is no acute infarct.  There is mild low-attenuation within the periventricular and subcortical white matter consistent small vessel ischemic disease.  Dense calcification along the left middle cerebral artery likely from atherosclerotic disease.  There is also moderate/severe calcified plaque of the bilateral vertebral arteries. SINUSES:           No sign of acute sinusitis.  MASTOIDS:          No sign of acute inflammation. SKULL:             No evidence for fracture or osseous abnormality. OTHER:             None.             CONCLUSION:   1.  Stable subarachnoid hemorrhage along the right frontal lobe, right sylvian fissure, and left parietal lobe.  2. Stable 2 mm right anterior frontal subdural hemorrhage.    LOCATION:  [facility]   Dictated by (CST): Zoltan Rowe MD on 8/13/2024 at 5:45 AM     Finalized by (CST): Zoltan Rowe MD on 8/13/2024 at 5:48 AM        CTA BRAIN + CTA CAROTIDS (CPT=70496/19347)     Result Date: 8/12/2024  PROCEDURE:  CTA BRAIN + CTA CAROTIDS (CPT=70496/62441)  COMPARISON:  [facility]  CT, CT BRAIN OR HEAD (77789), 8/12/2024, 5:10 PM.  INDICATIONS:  SAH, SDH  TECHNIQUE:  CT angiography of the head and neck were obtained with non-ionic contrast.  3D volume rendering images were obtained by the technologist as well as the radiologist on an independent workstation.  Multiplanar 3D reformatted imaging including multiplanar MIP images were obtained.  Curved planar reformats were performed through the carotid and vertebral arteries. All measurements obtained in this exam were performed using NASCET criteria.  Dose reduction techniques were used. Dose information is transmitted to the ACR (American College of Radiology) NRDR (National Radiology Data Registry) which includes the Dose Index Registry.  PATIENT STATED HISTORY:(As transcribed by Technologist)  Patient  c/o increased weakness. Pt has hx of prior stroke and has left side deficit of weakness. Per [facility], family stated that pt can usually walk with assist, but not today. Family state that pt is  answering questions correctly, but is more weak than usual.   CONTRAST USED:  75cc of Isovue 370  FINDINGS: Contrast in the vasculature noted.  Mild global brain parenchymal volume loss without overt hydrocephalus.  There is no midline shift or mass-effect.  The basal cisterns are patent.  The gray-white matter differentiation is intact.  Stable mild to moderate scattered subarachnoid hemorrhage most pronounced along the anterior inferior right frontal sulci, left parietal sulci, and minimally along the right sylvian fissure.   Stable thin subdural hematoma along the anterior right frontal lobe measuring up to 2 mm in thickness.  Stable mild chronic microvascular ischemic changes in the cerebral white matter.  There is no evident fracture.  Minimal ethmoid mucosal thickening.  Small left maxillary retention cyst.  The mastoid air cells are unremarkable.  Bilateral intra-ocular lens implants.  Scattered scarring/atelectasis in the partially imaged lungs.   Degenerative changes the spine.  There is approximately 50% stenosis in the distal supraclinoid segment of the right internal carotid artery due to mixed plaque.  There is moderate mixed predominately calcified plaque in the cavernous and supraclinoid segments of the internal carotid arteries without hemodynamically significant narrowing.  An anterior communicating artery is seen.  The right A1 segment is dominant.  There is moderate atherosclerotic irregularity and narrowing in the M1 and M2 branches of the right middle cerebral artery.  There is mild-to-moderate atherosclerotic irregularity and narrowing in the distal M1 and proximal M2 branches of the left middle cerebral artery.  The remainder of the branches of the anterior cerebral and middle cerebral arteries are unremarkable.  A small right posterior communicating artery is seen.  The left posterior communicating artery is not well seen.  The branches of the posterior cerebral and superior cerebellar arteries are unremarkable.  There is mild diffuse atherosclerotic irregularity and narrowing in the basilar artery noted.  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  There is mild-to-moderate scattered atherosclerotic plaque along the course of the V4 segment of the left vertebral artery without hemodynamically significant narrowing.  There is ectasia of the ascending aorta measuring up to 4 cm in diameter which could be further assessed with gated CTA of the thoracic aorta as clinically directed.  There is a 4-vessel aortic arch with mild mixed plaque.  There is scattered mild mixed plaque in the innominate artery and bilateral subclavian arteries without hemodynamically significant narrowing.  Scattered mild mixed plaque along the course of the common carotid arteries without hemodynamically significant narrowing.  There is  mild mixed plaque in both carotid bulbs without hemodynamically significant stenosis by NASCET criteria.  There is mild-to-moderate mixed plaque in the distal right cervical internal carotid artery near the skull base without hemodynamically significant narrowing.  Left vertebral artery originates directly from the thoracic aorta.  There is severe atherosclerotic narrowing in the V1 segment of the left vertebral artery due to mixed predominately noncalcified atherosclerotic plaque.  There is moderate atherosclerotic irregularity and narrowing at the origin of the right vertebral artery due to mixed atherosclerotic plaque.  There is scattered mild plaque along the course of the right cervical vertebral artery with approximately 50% narrowing in the distal V2 segment noted.  There is chronic appearing occlusion of the V3 segment extending into the V4 segment of the right vertebral artery.  The left vertebral artery is dominant.              CONCLUSION:   1. Stable mild to moderate scattered subarachnoid hemorrhage most pronounced along the anterior inferior right frontal sulci, left parietal sulci, and minimally along the right sylvian fissure.   2. Stable thin subdural hematoma along the anterior right frontal lobe measuring up to 2 mm in thickness.  3. Stable mild chronic microvascular ischemic changes in the cerebral white matter.  If there is clinical concern for acute ischemia/infarction, an MRI of the brain would be recommended for further evaluation.  4. There is moderate atherosclerotic irregularity and narrowing in the M1 and M2 branches of the right middle cerebral artery.  There is mild-to-moderate atherosclerotic irregularity and narrowing in the distal M1 and proximal M2 branches of the left middle cerebral artery.   5. There is moderate atherosclerotic irregularity and narrowing at the origin of the right vertebral artery due to mixed atherosclerotic plaque.  There is scattered mild plaque along the  course of the right cervical vertebral artery with approximately 50% narrowing in the distal V2 segment noted.  There is chronic appearing occlusion of the V3 segment extending into the V4 segment of the right vertebral artery.  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   6. There is severe atherosclerotic narrowing in the V1 segment of the left vertebral artery due to mixed predominately noncalcified atherosclerotic plaque.  There is mild-to-moderate scattered atherosclerotic plaque along the course of the V4 segment of the left vertebral artery without hemodynamically significant narrowing.  7. There is approximately 50% stenosis in the distal supraclinoid segment of the right internal carotid artery due to mixed plaque.   8. There is ectasia of the ascending aorta measuring up to 4 cm in diameter which could be further assessed with gated CTA of the thoracic aorta as clinically directed.  9. No evidence of occlusion, dissection, or flow-limiting stenosis in the cervical carotid arteries. No evidence of hemodynamically significant carotid stenosis by NASCET criteria.  Please see above for further details.  LOCATION:  [id number]   Dictated by (CST): Joseph Domínguez MD on 8/12/2024 at 8:06 PM     Finalized by (CST): Joseph Domínguez MD on 8/12/2024 at 8:21 PM        CT ABDOMEN+PELVIS(CONTRAST ONLY)(CPT=74177)     Result Date: 8/12/2024  PROCEDURE:  CT ABDOMEN+PELVIS (CONTRAST ONLY) (CPT=74177)  COMPARISON:  None.  INDICATIONS:  diarrhea, lactic acidosis, tachcyardia, unknown infx source  TECHNIQUE:  CT scanning was performed from the dome of the diaphragm to the pubic symphysis with non-ionic intravenous contrast material. Post contrast coronal MPR imaging was performed.  Dose reduction techniques were used. Dose information is transmitted to the ACR (American College of Radiology) NRDR  (National Radiology Data Registry) which includes the Dose Index Registry.  PATIENT STATED HISTORY:(As transcribed by Technologist)  Patient c/o increased weakness. Pt has hx of prior stroke and has left side deficit of weakness. Per [facility], family stated that pt can usually walk with assist, but not today. Family state that pt is answering questions correctly, but is more weak than usual.   CONTRAST USED:  100cc of Isovue 370  FINDINGS:  LIVER:  No enlargement, atrophy, abnormal density, or significant focal lesion.  BILIARY:  There is density in the dependent part of the gallbladder consistent with noncalcified cholelithiasis or gallbladder sludge.  There is no CT evidence of cholecystitis. PANCREAS:  No lesion, fluid collection, ductal dilatation, or atrophy.  SPLEEN:  No enlargement or focal lesion.  KIDNEYS:  Scattered benign cysts are noted in both kidneys.  Nonobstructing 6 mm calcification lower pole left kidney is noted.  Nonobstructing 3 mm calcification upper pole right kidney is noted. ADRENALS:  No mass or enlargement.  AORTA/VASCULAR:  Severe aortic atherosclerosis with irregular ulcerated plaque is noted. RETROPERITONEUM:  No mass or adenopathy.  BOWEL/MESENTERY:  No visible mass, obstruction, or bowel wall thickening.  ABDOMINAL WALL:  No mass or hernia.  URINARY BLADDER:  There are dependent stones in the bladder measuring 0.8 and 0.5 cm. PELVIC NODES:  No adenopathy.  PELVIC ORGANS:  No visible mass.  Pelvic organs appropriate for patient age.  BONES:  No bony lesion or fracture.  LUNG BASES:  There is subpleural reticulation with suggestion of traction bronchiectasis.  This could indicate interstitial lung disease or dependent atelectasis. OTHER:  Negative.              CONCLUSION:  1. Specific etiology for infection and lactic acidosis is not detected on this study. 2. Severe aortic atherosclerosis with plaque ulceration is noted.  There is no specific stenosis detected. 3. Density in the dependent  part of the gallbladder could represent noncalcified cholelithiasis or gallbladder sludge. 4. Bilateral nonobstructing nephrolithiasis. 5. Dependent bladder calcifications are noted. 6. Reticular densities with possible traction bronchiectasis in lower lungs could represent interstitial lung disease or be in part related to dependent atelectasis.  If indicated follow-up high-resolution chest CT would be more specific.    LOCATION:  [facility]   Dictated by (CST): Saleem Harrison MD on 8/12/2024 at 7:38 PM     Finalized by (CST): Saleem Harrison MD on 8/12/2024 at 7:43 PM        CT BRAIN OR HEAD (CPT=70450)     Result Date: 8/12/2024  PROCEDURE:  CT BRAIN OR HEAD (13701)  COMPARISON:  [facility] , CT, CT BRAIN OR HEAD (38196), 5/10/2021, 5:24 PM.  INDICATIONS:  weakness  TECHNIQUE:  Noncontrast CT scanning is performed through the brain. Dose reduction techniques were used. Dose information is transmitted to the ACR (American College of Radiology) NRDR (National Radiology Data Registry) which includes the Dose Index Registry.  PATIENT STATED HISTORY: (As transcribed by Technologist)  Patient is here for increased weakness. Patient has a hx of stroke w/left sided deficits.   FINDINGS: Mild global brain parenchymal volume loss without overt hydrocephalus.  There is no midline shift or mass-effect.  The basal cisterns are patent.  The gray-white matter differentiation is intact.  There is mild to moderate scattered subarachnoid hemorrhage most pronounced along the anterior inferior right frontal sulci, left parietal sulci, and minimally along the right sylvian fissure.  CTA of the head is suggested for further evaluation.  Thin subdural hematoma along the anterior right frontal lobe measuring up to 2 mm in thickness.  Stable mild chronic microvascular ischemic changes in the cerebral white matter.  Scattered vascular calcifications.  There is no evident fracture.  Minimal ethmoid mucosal thickening.  The mastoid air cells are  unremarkable.  Bilateral intra-ocular lens implants.              CONCLUSION:   1. There is mild to moderate scattered subarachnoid hemorrhage most pronounced along the anterior inferior right frontal sulci, left parietal sulci, and minimally along the right sylvian fissure.  CTA of the head is suggested for further evaluation.  2. Thin subdural hematoma along the anterior right frontal lobe measuring up to 2 mm in thickness.  3. Stable mild chronic microvascular ischemic changes in the cerebral white matter.  If there is clinical concern for acute ischemia/infarction, an MRI of the brain would be recommended for further evaluation.  Critical results were discussed with Dr. Torres at 1733 hours on 8/12/2024. Critical results were read back.  LOCATION:  [id number]   Dictated by ([facility]): Joseph Domínguez MD on 8/12/2024 at 5:30 PM     Finalized by (CST): Joseph Domínguez MD on 8/12/2024 at 5:36 PM        XR CHEST AP PORTABLE  (CPT=71045)     Result Date: 8/12/2024  PROCEDURE:  XR CHEST AP PORTABLE  (CPT=71045)  TECHNIQUE:  AP chest radiograph was obtained.  COMPARISON:  None.  INDICATIONS:  weakness  PATIENT STATED HISTORY: (As transcribed by Technologist)  Patient stated feeling weak today.    FINDINGS:  Calcified plaque in the tortuous thoracic aorta.  Borderline heart size with normal pulmonary vascularity.  Mild scarring/atelectasis in the lower lungs.  No pleural effusion or pneumothorax.  Degenerative changes in the spine.             CONCLUSION:  No lobar pneumonia or overt congestive failure.  Mild scarring/atelectasis in the lower lungs.   LOCATION:  [id number]      Dictated by ([facility]): Joseph Domínguez MD on 8/12/2024 at 5:00 PM     Finalized by (CST): Joesph Domínguez MD on 8/12/2024 at 5:01 PM           Assessment/Plan:      79 yr old male with PMH sig for CVA, DM II, HTN, and HLD who presented to the ED for evaluation of weakness and diarrhea.     # Subarachnoid hemorrhage   # Subdural hematoma   - unclear etiology   -  NSGY and neuroCC c/s appreciated   - neuro checks and BP parameters per neuroCC  - cont keppra for seizure prophy   - PT/OT/speech     # Lactic acidosis  - cont IVFs  - trend     # Essential HTN  - hold home BP meds  - maintain BP parameters per neuroCC and NSGY     # HLD  - cont statin     # DM II with hyperglycemia   - hold po DM meds  - ISS with accuchecks      # Hx of CVA  # Residual R sided hemiparesis   - hold plavix given SAH/SDH  - PT/OT/speech     DVT prophy - SCD  Dispo: inpt care in the neuroICU.  POC d/w pt's family          8/13 Neuro consult         REASON FOR CONSULTATION:  SAH, SDH     HISTORY OF PRESENT ILLNESS:  Raymond Smith is a(n) 79 year old male with a PMH of CVA with residual right side weakness, DM, HTN, and HLD who presented to the ED with weakness and not being able to get out of bed. CT head was completed which shows SAH and right acute SDH. No recent falls per family. CTA H/N was negative for any vascular etiology. He takes Plavix at home.      Currently, patient is resting in bed. Wakens to verbal stim per son in law in Spanish. The patient is oriented x3, but lethargic and son in law assists with history since patient is a poor historian.     PAST MEDICAL HISTORY:  Past Medical History       Past Medical History:    CVA (cerebral vascular accident) (HCC)    Diabetes (HCC)    Essential hypertension    High blood pressure    High cholesterol            PAST SURGICAL HISTORY:  Past Surgical History         Past Surgical History:   Procedure Laterality Date    Other surgical history         feeding tube            FAMILY HISTORY:  family history is not on file.     SOCIAL HISTORY:   reports that he has never smoked. He has never used smokeless tobacco. He reports that he does not drink alcohol and does not use drugs.     ALLERGIES:  Allergies   No Known Allergies        MEDICATIONS:  Prescriptions Prior to Admission           Medications Prior to Admission   Medication Sig Dispense Refill  Last Dose    donepezil 10 MG Oral Tab Take 1 tablet (10 mg total) by mouth nightly.     Past Week    losartan 50 MG Oral Tab Take 1 tablet (50 mg total) by mouth daily.     Past Month    metFORMIN 500 MG Oral Tab Take 1 tablet (500 mg total) by mouth in the morning, at noon, and at bedtime. (Patient taking differently: Take 1 tablet (500 mg total) by mouth in the morning, at noon, and at bedtime. 2 tabs (Metformin 1000 mg every evening)) 270 tablet 3 8/11/2024    clopidogrel 75 MG Oral Tab Take 1 tablet (75 mg total) by mouth.     More than a month    erythromycin 5 MG/GM Ophthalmic Ointment Apply a small amount to both eye before bedtime          ONETOUCH ULTRA In Vitro Strip 1 each by Other route 3 (three) times daily. 400 each 3      atorvastatin 20 MG Oral Tab Take 1 tablet (20 mg total) by mouth daily. 90 tablet 3 More than a month    Blood Glucose Monitoring Suppl (ONETOUCH ULTRA 2) w/Device Does not apply Kit 1 lancet by Finger stick route 3 (three) times daily.          Lancets (ONETOUCH DELICA PLUS [id number]) Does not apply Misc 1 strip by In Vitro route 3 (three) times daily.          hydrochlorothiazide 12.5 MG Oral Tab Take 1 tablet by mouth daily. (Patient not taking: Reported on 1/13/2022)     Unknown         Current Hospital Medications          Current Facility-Administered Medications   Medication Dose Route Frequency    acetaminophen (Tylenol) tab 650 mg  650 mg Oral Q4H PRN     Or    acetaminophen (Tylenol) rectal suppository 650 mg  650 mg Rectal Q4H PRN    labetalol (Trandate) 5 mg/mL injection 10 mg  10 mg Intravenous Q10 Min PRN    hydrALAzine (Apresoline) 20 mg/mL injection 10 mg  10 mg Intravenous Q2H PRN    ondansetron (Zofran) 4 MG/2ML injection 4 mg  4 mg Intravenous Q6H PRN    levETIRAcetam (Keppra) 500 mg/5mL injection 500 mg  500 mg Intravenous Q12H    metoclopramide (Reglan) 5 mg/mL injection 5 mg  5 mg Intravenous Q8H PRN    morphINE PF 2 MG/ML injection 1 mg  1 mg Intravenous Q2H  PRN     Or    morphINE PF 2 MG/ML injection 2 mg  2 mg Intravenous Q2H PRN     Or    morphINE PF 4 MG/ML injection 4 mg  4 mg Intravenous Q2H PRN    acetaminophen (Tylenol) tab 650 mg  650 mg Oral Q4H PRN     Or    HYDROcodone-acetaminophen (Norco) 5-325 MG per tab 1 tablet  1 tablet Oral Q4H PRN     Or    HYDROcodone-acetaminophen (Norco) 5-325 MG per tab 2 tablet  2 tablet Oral Q4H PRN    glucose (Dex4) 15 GM/59ML oral liquid 15 g  15 g Oral Q15 Min PRN     Or    glucose (Glutose) 40% oral gel 15 g  15 g Oral Q15 Min PRN     Or    glucose-vitamin C (Dex-4) chewable tab 4 tablet  4 tablet Oral Q15 Min PRN     Or    dextrose 50% injection 50 mL  50 mL Intravenous Q15 Min PRN     Or    glucose (Dex4) 15 GM/59ML oral liquid 30 g  30 g Oral Q15 Min PRN     Or    glucose (Glutose) 40% oral gel 30 g  30 g Oral Q15 Min PRN     Or    glucose-vitamin C (Dex-4) chewable tab 8 tablet  8 tablet Oral Q15 Min PRN    insulin aspart (NovoLOG) 100 Units/mL FlexPen 1-5 Units  1-5 Units Subcutaneous TID AC and HS            REVIEW OF SYSTEMS:  A 10-point system was reviewed.  Pertinent positives and negatives are noted in HPI.       PHYSICAL EXAMINATION:  VITAL SIGNS: /82 (BP Location: Left arm)   Pulse 96   Temp 98.4 °F (36.9 °C) (Temporal)   Resp 19   Wt 131 lb 13.4 oz (59.8 kg)   SpO2 98%   BMI 19.47 kg/m²   GENERAL:  Patient is a 79 year old male in no acute distress.  HEENT:  Normocephalic, atraumatic.  NEUROLOGICAL:  This patient is alert and orientated x 3.  Speech fluent. Comprehension intact.   PERRLA +3 brisk,  EOMI.  Right facial droop CN's GI.  Sensation to light touch is intact bilaterally.  No Pronator Drift.  Moving all extremities antigravity.  Gait deferred.           DIAGNOSTIC DATA:         Lab Results   Component Value Date     WBC 16.1 08/13/2024     HGB 14.2 08/13/2024     HCT 44.4 08/13/2024     .0 08/13/2024     CREATSERUM 1.17 08/13/2024     BUN 15 08/13/2024      08/13/2024     K 4.0  08/13/2024      08/13/2024     CO2 22.0 08/13/2024      08/13/2024     CA 10.4 08/13/2024     ALB 5.4 08/12/2024     ALKPHO 60 08/12/2024     BILT 1.2 08/12/2024     TP 8.8 08/12/2024     AST 15 08/12/2024     ALT 13 08/12/2024     PTT 23.7 08/12/2024     INR 1.01 08/12/2024     PTP 13.3 08/12/2024     TSH 1.525 08/12/2024     MG 2.2 08/12/2024     PGLU 198 08/13/2024         IMAGING:  CT BRAIN OR HEAD (CPT=70450)     Result Date: 8/13/2024  CONCLUSION:   1.  Stable subarachnoid hemorrhage along the right frontal lobe, right sylvian fissure, and left parietal lobe.  2. Stable 2 mm right anterior frontal subdural hemorrhage.    LOCATION:  [facility]   Dictated by (CST): Zoltan Rowe MD on 8/13/2024 at 5:45 AM     Finalized by (CST): oZltan Rowe MD on 8/13/2024 at 5:48 AM        CTA BRAIN + CTA CAROTIDS (CPT=70496/45166)     Result Date: 8/12/2024  CONCLUSION:   1. Stable mild to moderate scattered subarachnoid hemorrhage most pronounced along the anterior inferior right frontal sulci, left parietal sulci, and minimally along the right sylvian fissure.   2. Stable thin subdural hematoma along the anterior right frontal lobe measuring up to 2 mm in thickness.  3. Stable mild chronic microvascular ischemic changes in the cerebral white matter.  If there is clinical concern for acute ischemia/infarction, an MRI of the brain would be recommended for further evaluation.  4. There is moderate atherosclerotic irregularity and narrowing in the M1 and M2 branches of the right middle cerebral artery.  There is mild-to-moderate atherosclerotic irregularity and narrowing in the distal M1 and proximal M2 branches of the left middle cerebral artery.   5. There is moderate atherosclerotic irregularity and narrowing at the origin of the right vertebral artery due to mixed atherosclerotic plaque.  There is scattered mild plaque along the course of the right cervical vertebral artery with approximately 50% narrowing in the  distal V2 segment noted.  There is chronic appearing occlusion of the V3 segment extending into the V4 segment of the right vertebral artery.  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   6. There is severe atherosclerotic narrowing in the V1 segment of the left vertebral artery due to mixed predominately noncalcified atherosclerotic plaque.  There is mild-to-moderate scattered atherosclerotic plaque along the course of the V4 segment of the left vertebral artery without hemodynamically significant narrowing.  7. There is approximately 50% stenosis in the distal supraclinoid segment of the right internal carotid artery due to mixed plaque.   8. There is ectasia of the ascending aorta measuring up to 4 cm in diameter which could be further assessed with gated CTA of the thoracic aorta as clinically directed.  9. No evidence of occlusion, dissection, or flow-limiting stenosis in the cervical carotid arteries. No evidence of hemodynamically significant carotid stenosis by NASCET criteria.  Please see above for further details.  LOCATION:  [id number]   Dictated by (CST): Joseph Domínguez MD on 8/12/2024 at 8:06 PM     Finalized by (CST): Joseph Domínguez MD on 8/12/2024 at 8:21 PM        CT ABDOMEN+PELVIS(CONTRAST ONLY)(CPT=74177)     Result Date: 8/12/2024  CONCLUSION:  1. Specific etiology for infection and lactic acidosis is not detected on this study. 2. Severe aortic atherosclerosis with plaque ulceration is noted.  There is no specific stenosis detected. 3. Density in the dependent part of the gallbladder could represent noncalcified cholelithiasis or gallbladder sludge. 4. Bilateral nonobstructing nephrolithiasis. 5. Dependent bladder calcifications are noted. 6. Reticular densities with possible traction bronchiectasis in lower lungs could represent interstitial lung disease or be in  part related to dependent atelectasis.  If indicated follow-up high-resolution chest CT would be more specific.    LOCATION:  [facility]   Dictated by (CST): Saleem Harrison MD on 8/12/2024 at 7:38 PM     Finalized by (CST): Saleem Harrison MD on 8/12/2024 at 7:43 PM        CT BRAIN OR HEAD (CPT=70450)     Result Date: 8/12/2024  CONCLUSION:   1. There is mild to moderate scattered subarachnoid hemorrhage most pronounced along the anterior inferior right frontal sulci, left parietal sulci, and minimally along the right sylvian fissure.  CTA of the head is suggested for further evaluation.  2. Thin subdural hematoma along the anterior right frontal lobe measuring up to 2 mm in thickness.  3. Stable mild chronic microvascular ischemic changes in the cerebral white matter.  If there is clinical concern for acute ischemia/infarction, an MRI of the brain would be recommended for further evaluation.  Critical results were discussed with Dr. Torres at 1733 hours on 8/12/2024. Critical results were read back.  LOCATION:  [id number]   Dictated by (CST): Joseph Domínguez MD on 8/12/2024 at 5:30 PM     Finalized by (CST): Joseph Domínguez MD on 8/12/2024 at 5:36 PM        XR CHEST AP PORTABLE  (CPT=71045)     Result Date: 8/12/2024  CONCLUSION:  No lobar pneumonia or overt congestive failure.  Mild scarring/atelectasis in the lower lungs.   LOCATION:  [id number]      Dictated by (CST): Joseph Domínguez MD on 8/12/2024 at 5:00 PM     Finalized by (CST): Joseph Domínguez MD on 8/12/2024 at 5:01 PM          ASSESSMENT:  80 yo male with SAH/SDH     Plan:  No surgical intervention at this time  Follow up head CT was stable  No AC or AP  Keppra per [facility]  MRI/MRA to rule out any vascular etiology  DVT: SCDs  Therapy as tolerated        EAN Pruett  [facility]  8/13/2024, 8:38 AM   Spectre [id number]        A total of 60 minutes of visit time (exclusible of billable procedures) was administered.  > 50 % of time spent  counseling/coordinating care      Is this a shared or split note between Advanced Practice Provider and Physician? Yes           Attestation signed by Yury Sosa MD at 8/13/2024 10:14 AM     Patient examined and assessed. Agree with above.     Spontaneous convexity SAH and acute left frontal SDH and chronic left convexity SDH without known trauma.     Reportedly not on antithrombotic medication. Takes ginko instead.      At neurological baseline today. A&Ox3, symmetric motor exam without drift on my evaluation.  Polish speaking, with son-in-law interpreting.    I spoke to the patient's son-in-law at the bedside about the current clinical situation an the plan of care. He expressed understanding and agreement with the plan.     MRI/MRA brain w/wo pending     Discussed with NCC                                     MEDICATIONS ADMINISTERED IN LAST 1 DAY:  hydrALAzine (Apresoline) 20 mg/mL injection 10 mg       Date Action Dose Route User    8/13/2024 0921 Given 10 mg Intravenous Samy Isbell RN    8/13/2024 0406 Given 10 mg Intravenous Arianna Trejo RN          insulin aspart (NovoLOG) 100 Units/mL FlexPen 1-5 Units       Date Action Dose Route User    8/13/2024 0620 Given 2 Units Subcutaneous (Right Upper Arm) Arianna Trejo RN    8/12/2024 2354 Given 4 Units Subcutaneous (Right Upper Arm) Arianna Trejo RN          iopamidol 76% (ISOVUE-370) injection for power injector       Date Action Dose Route User    8/12/2024 2000 Given 80 mL Intravenous Chepe Gomez          iopamidol 76% (ISOVUE-370) injection for power injector       Date Action Dose Route User    8/12/2024 2001 Given 75 mL Intravenous Chepe Gomez          labetalol (Trandate) 5 mg/mL injection 10 mg       Date Action Dose Route User    8/12/2024 2054 Given 10 mg Intravenous Tia Sanchez RN          levETIRAcetam (Keppra) 500 mg/5mL injection 500 mg       Date Action Dose Route User    8/13/2024  0832 Given 500 mg Intravenous Samy Isbell RN    8/12/2024 2245 Given 500 mg Intravenous Arianna Trejo RN          piperacillin-tazobactam (Zosyn) 4.5 g in dextrose 5% 100 mL IVPB-ADDV       Date Action Dose Route User    8/12/2024 1744 New Bag 4.5 g Intravenous Jessica Hawkins RN          sodium chloride 0.9% infusion       Date Action Dose Route User    8/13/2024 0000 Rate/Dose Verify (none) Intravenous Arianna Trejo RN    8/12/2024 2205 New Bag (none) Intravenous Arianna Trejo RN          sodium chloride 0.9 % IV bolus 1,000 mL       Date Action Dose Route User    8/12/2024 1617 New Bag 1,000 mL Intravenous Jessica Hawkins RN          sodium chloride 0.9 % IV bolus 2,259 mL       Date Action Dose Route User    8/12/2024 1747 New Bag 2,259 mL Intravenous Jessica Hawkins RN            Vitals (last day)       Date/Time Temp Pulse Resp BP SpO2 Weight O2 Device O2 Flow Rate (L/min) [facility]    08/13/24 1000 -- 100 20 138/65 98 % -- -- -- PS    08/13/24 0900 -- 93 19 171/80 99 % -- -- -- PS    08/13/24 0800 98.4 °F (36.9 °C) 96 19 121/82 98 % -- None (Room air) -- PS    08/13/24 0700 -- 94 17 145/75 97 % -- -- -- RP    08/13/24 0700 98.4 °F (36.9 °C) -- -- -- -- -- -- -- PS    08/13/24 0600 -- 92 25 133/81 97 % -- -- -- RP    08/13/24 0500 -- 96 20 113/70 98 % -- -- -- RP    08/13/24 0430 -- 97 19 129/75 97 % -- -- -- RP    08/13/24 0400 98 °F (36.7 °C) 96 23 155/96 96 % -- None (Room air) -- RP    08/13/24 0300 -- 96 19 146/77 97 % -- -- -- RP    08/13/24 0200 -- 91 19 121/72 95 % -- -- -- RP    08/13/24 0100 -- 87 21 133/73 96 % -- -- -- RP    08/13/24 0045 -- 87 21 -- 97 % -- -- -- RP    08/13/24 0000 97.7 °F (36.5 °C) 92 22 147/82 97 % 131 lb 13.4 oz (59.8 kg) None (Room air) -- RP    08/12/24 2300 -- 90 22 145/83 100 % -- -- -- RP    08/12/24 2200 -- 89 18 150/83 97 % -- -- -- RP    08/12/24 2136 -- 87 26 -- 97 % 131 lb 13.4 oz (59.8 kg) -- -- RP    08/12/24 2131 97.4 °F (36.3 °C) 87 25  155/85 98 % -- None (Room air) --     08/12/24 2059 97.3 °F (36.3 °C) -- -- -- -- -- -- -- SC    08/12/24 2042 -- 108 18 180/98 96 % -- None (Room air) -- SC    08/12/24 2000 -- 109 19 189/98 96 % -- None (Room air) -- SC    08/12/24 1800 -- 104 19 159/85 95 % -- None (Room air) -- AT    08/12/24 1745 -- 108 24 156/91 95 % -- None (Room air) -- AT    08/12/24 1553 97.6 °F (36.4 °C) 132 23 156/95 95 % 166 lb 0.1 oz (75.3 kg) None (Room air) -- AT          [facility] Scores (since admission)       None

## 2024-08-13 NOTE — H&P
[facility] Hospitalist History and Physical      Chief Complaint   Patient presents with    Fatigue     Increased weakness for the last hour        PCP: Lisa Wetzel MD      History of Present Illness: Patient is a 79 year old male with PMH sig for CVA, DM II, HTN, and HLD who presented to the ED for evaluation of weakness and diarrhea.  He has baseline residual R hemiparesis, facial droop, and slurred speech since his CVA.  Yesterday he was unable to get out of bed, unable to ambulate.  Thus, he was brought to the ED.  Pt lives independently with his spouse.  Per his son in [facility], pt was in his usual state of health prior to yesterday, able to care for himself, goran any known falls.     In the ED, CT brain showed scattered SAH and thin R acute SDH.  CTA neg for aneurysm.  He was admitted to the NICU for further evaluation and treatment.      On my evaluation, pt responds to commands, no new complaints.  Son in [facility] at bedside.      Past Medical History:    CVA (cerebral vascular accident) (HCC)    Diabetes (HCC)    Essential hypertension    High blood pressure    High cholesterol      Past Surgical History:   Procedure Laterality Date    Other surgical history      feeding tube        ALL:  No Known Allergies     No current facility-administered medications on file prior to encounter.     Current Outpatient Medications on File Prior to Encounter   Medication Sig Dispense Refill    donepezil 10 MG Oral Tab Take 1 tablet (10 mg total) by mouth nightly.      losartan 50 MG Oral Tab Take 1 tablet (50 mg total) by mouth daily.      metFORMIN 500 MG Oral Tab Take 1 tablet (500 mg total) by mouth in the morning, at noon, and at bedtime. (Patient taking differently: Take 1 tablet (500 mg total) by mouth in the morning, at noon, and at bedtime. 2 tabs (Metformin 1000 mg every evening)) 270 tablet 3    clopidogrel 75 MG Oral Tab Take 1 tablet (75 mg total) by mouth.      erythromycin 5 MG/GM  Ophthalmic Ointment Apply a small amount to both eye before bedtime      ONETOUCH ULTRA In Vitro Strip 1 each by Other route 3 (three) times daily. 400 each 3    atorvastatin 20 MG Oral Tab Take 1 tablet (20 mg total) by mouth daily. 90 tablet 3    Blood Glucose Monitoring Suppl (ONETOUCH ULTRA 2) w/Device Does not apply Kit 1 lancet by Finger stick route 3 (three) times daily.      Lancets (ONETOUCH DELICA PLUS [id number]) Does not apply Misc 1 strip by In Vitro route 3 (three) times daily.      hydrochlorothiazide 12.5 MG Oral Tab Take 1 tablet by mouth daily. (Patient not taking: Reported on 1/13/2022)     '      Social History     Tobacco Use    Smoking status: Never    Smokeless tobacco: Never   Substance Use Topics    Alcohol use: Never        Fam Hx  History reviewed. No pertinent family history.    Review of Systems  Comprehensive ROS reviewed and negative except for what is stated in HPI.      OBJECTIVE:  /81   Pulse 92   Temp 98 °F (36.7 °C) (Temporal)   Resp 25   Wt 131 lb 13.4 oz (59.8 kg)   SpO2 97%   BMI 19.47 kg/m²   Gen: +dysarthria.  Aox 3.  R facial droop.  Responds well to commands.     HEENT:  EOMI, PERRLA, OP clear, MMM  Pulm: Lungs clear bilaterally, normal respiratory effort  CV: Tachy, reg, no murmur.  Normal PMI.    Abd: Abdomen soft, nontender, nondistended, no organomegaly, bowel sounds present  MSK: Full range of motion in extremities, no clubbing, no cyanosis  Skin: no rashes or lesions  Neuro:  RUE/RLE 4/5 strength       Data Review:    LABS:   Lab Results   Component Value Date    WBC 16.1 08/13/2024    HGB 14.2 08/13/2024    HCT 44.4 08/13/2024    .0 08/13/2024    CREATSERUM 1.17 08/13/2024    BUN 15 08/13/2024     08/13/2024    K 4.0 08/13/2024     08/13/2024    CO2 22.0 08/13/2024     08/13/2024    CA 10.4 08/13/2024    ALB 5.4 08/12/2024    ALKPHO 60 08/12/2024    BILT 1.2 08/12/2024    TP 8.8 08/12/2024    AST 15 08/12/2024    ALT 13 08/12/2024     PTT 23.7 08/12/2024    INR 1.01 08/12/2024    PTP 13.3 08/12/2024    TSH 1.525 08/12/2024    MG 2.2 08/12/2024    PGLU 198 08/13/2024       CXR: image personally reviewed.      Radiology: CT BRAIN OR HEAD (CPT=70450)    Result Date: 8/13/2024  PROCEDURE:  CT BRAIN OR HEAD (89061)  COMPARISON:  [facility] , CT, CTA BRAIN + CTA CAROTIDS (CPT=70496/15157), 8/12/2024, 7:38 PM.  [facility] , CT, CT BRAIN OR HEAD (16000), 8/12/2024, 5:10 PM.  [facility] , CT, CT BRAIN OR HEAD (05198), 5/10/2021, 5:24 PM.  INDICATIONS:  F/u SAH  TECHNIQUE:  Noncontrast CT scanning is performed through the brain. Dose reduction techniques were used. Dose information is transmitted to the ACR (American College of Radiology) NRDR (National Radiology Data Registry) which includes the Dose Index Registry.  PATIENT STATED HISTORY: (As transcribed by Technologist)  Follow up SAH    FINDINGS:  VENTRICLES/SULCI:  Ventricles and sulci are normal in size.  INTRACRANIAL:  Subarachnoid hemorrhage noted along the right inferior and lateral frontal lobe and the left high convexity parietal lobe is again noted and stable since previous study.  There is minimal subarachnoid hemorrhage within the right sylvian fissure.  Trace subdural hemorrhage along the anterior right frontal lobe is stable at 2 mm. There is no acute infarct.  There is mild low-attenuation within the periventricular and subcortical white matter consistent small vessel ischemic disease.  Dense calcification along the left middle cerebral artery likely from atherosclerotic disease.  There is also moderate/severe calcified plaque of the bilateral vertebral arteries. SINUSES:           No sign of acute sinusitis.  MASTOIDS:          No sign of acute inflammation. SKULL:             No evidence for fracture or osseous abnormality. OTHER:             None.            CONCLUSION:   1.  Stable subarachnoid hemorrhage along the right frontal lobe, right sylvian fissure, and left parietal lobe.  2. Stable 2  mm right anterior frontal subdural hemorrhage.    LOCATION:  [facility]   Dictated by (CST): Zoltan Rowe MD on 8/13/2024 at 5:45 AM     Finalized by (CST): Zoltan Rowe MD on 8/13/2024 at 5:48 AM       CTA BRAIN + CTA CAROTIDS (CPT=70496/92348)    Result Date: 8/12/2024  PROCEDURE:  CTA BRAIN + CTA CAROTIDS (CPT=70496/76466)  COMPARISON:  [facility] , CT, CT BRAIN OR HEAD (79844), 8/12/2024, 5:10 PM.  INDICATIONS:  SAH, SDH  TECHNIQUE:  CT angiography of the head and neck were obtained with non-ionic contrast.  3D volume rendering images were obtained by the technologist as well as the radiologist on an independent workstation.  Multiplanar 3D reformatted imaging including multiplanar MIP images were obtained.  Curved planar reformats were performed through the carotid and vertebral arteries. All measurements obtained in this exam were performed using NASCET criteria.  Dose reduction techniques were used. Dose information is transmitted to the ACR (American College of Radiology) NRDR (National Radiology Data Registry) which includes the Dose Index Registry.  PATIENT STATED HISTORY:(As transcribed by Technologist)  Patient  c/o increased weakness. Pt has hx of prior stroke and has left side deficit of weakness. Per [facility], family stated that pt can usually walk with assist, but not today. Family state that pt is  answering questions correctly, but is more weak than usual.   CONTRAST USED:  75cc of Isovue 370  FINDINGS: Contrast in the vasculature noted.  Mild global brain parenchymal volume loss without overt hydrocephalus.  There is no midline shift or mass-effect.  The basal cisterns are patent.  The gray-white matter differentiation is intact.  Stable mild to moderate scattered subarachnoid hemorrhage most pronounced along the anterior inferior right frontal sulci, left parietal sulci, and minimally along the right sylvian fissure.   Stable thin subdural hematoma along the anterior right frontal lobe measuring up to 2 mm  in thickness.  Stable mild chronic microvascular ischemic changes in the cerebral white matter.  There is no evident fracture.  Minimal ethmoid mucosal thickening.  Small left maxillary retention cyst.  The mastoid air cells are unremarkable.  Bilateral intra-ocular lens implants.  Scattered scarring/atelectasis in the partially imaged lungs.  Degenerative changes the spine.  There is approximately 50% stenosis in the distal supraclinoid segment of the right internal carotid artery due to mixed plaque.  There is moderate mixed predominately calcified plaque in the cavernous and supraclinoid segments of the internal carotid arteries without hemodynamically significant narrowing.  An anterior communicating artery is seen.  The right A1 segment is dominant.  There is moderate atherosclerotic irregularity and narrowing in the M1 and M2 branches of the right middle cerebral artery.  There is mild-to-moderate atherosclerotic irregularity and narrowing in the distal M1 and proximal M2 branches of the left middle cerebral artery.  The remainder of the branches of the anterior cerebral and middle cerebral arteries are unremarkable.  A small right posterior communicating artery is seen.  The left posterior communicating artery is not well seen.  The branches of the posterior cerebral and superior cerebellar arteries are unremarkable.  There is mild diffuse atherosclerotic irregularity and narrowing in the basilar artery noted.  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  There is mild-to-moderate scattered atherosclerotic plaque along the course of the V4 segment of the left vertebral artery without hemodynamically significant narrowing.  There is ectasia of the ascending aorta measuring up to 4 cm in diameter which could be further assessed with gated CTA of the thoracic aorta as clinically  directed.  There is a 4-vessel aortic arch with mild mixed plaque.  There is scattered mild mixed plaque in the innominate artery and bilateral subclavian arteries without hemodynamically significant narrowing.  Scattered mild mixed plaque along the course of the common carotid arteries without hemodynamically significant narrowing.  There is mild mixed plaque in both carotid bulbs without hemodynamically significant stenosis by NASCET criteria.  There is mild-to-moderate mixed plaque in the distal right cervical internal carotid artery near the skull base without hemodynamically significant narrowing.  Left vertebral artery originates directly from the thoracic aorta.  There is severe atherosclerotic narrowing in the V1 segment of the left vertebral artery due to mixed predominately noncalcified atherosclerotic plaque.  There is moderate atherosclerotic irregularity and narrowing at the origin of the right vertebral artery due to mixed atherosclerotic plaque.  There is scattered mild plaque along the course of the right cervical vertebral artery with approximately 50% narrowing in the distal V2 segment noted.  There is chronic appearing occlusion of the V3 segment extending into the V4 segment of the right vertebral artery.  The left vertebral artery is dominant.             CONCLUSION:   1. Stable mild to moderate scattered subarachnoid hemorrhage most pronounced along the anterior inferior right frontal sulci, left parietal sulci, and minimally along the right sylvian fissure.   2. Stable thin subdural hematoma along the anterior right frontal lobe measuring up to 2 mm in thickness.  3. Stable mild chronic microvascular ischemic changes in the cerebral white matter.  If there is clinical concern for acute ischemia/infarction, an MRI of the brain would be recommended for further evaluation.  4. There is moderate atherosclerotic irregularity and narrowing in the M1 and M2 branches of the right middle cerebral  artery.  There is mild-to-moderate atherosclerotic irregularity and narrowing in the distal M1 and proximal M2 branches of the left middle cerebral artery.   5. There is moderate atherosclerotic irregularity and narrowing at the origin of the right vertebral artery due to mixed atherosclerotic plaque.  There is scattered mild plaque along the course of the right cervical vertebral artery with approximately 50% narrowing in the distal V2 segment noted.  There is chronic appearing occlusion of the V3 segment extending into the V4 segment of the right vertebral artery.  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   6. There is severe atherosclerotic narrowing in the V1 segment of the left vertebral artery due to mixed predominately noncalcified atherosclerotic plaque.  There is mild-to-moderate scattered atherosclerotic plaque along the course of the V4 segment of the left vertebral artery without hemodynamically significant narrowing.  7. There is approximately 50% stenosis in the distal supraclinoid segment of the right internal carotid artery due to mixed plaque.   8. There is ectasia of the ascending aorta measuring up to 4 cm in diameter which could be further assessed with gated CTA of the thoracic aorta as clinically directed.  9. No evidence of occlusion, dissection, or flow-limiting stenosis in the cervical carotid arteries. No evidence of hemodynamically significant carotid stenosis by NASCET criteria.  Please see above for further details.  LOCATION:  [id number]   Dictated by (CST): Joseph Domínguez MD on 8/12/2024 at 8:06 PM     Finalized by (CST): Joseph Domínguez MD on 8/12/2024 at 8:21 PM       CT ABDOMEN+PELVIS(CONTRAST ONLY)(CPT=74177)    Result Date: 8/12/2024  PROCEDURE:  CT ABDOMEN+PELVIS (CONTRAST ONLY) (CPT=74177)  COMPARISON:  None.  INDICATIONS:  diarrhea, lactic  acidosis, tachcyardia, unknown infx source  TECHNIQUE:  CT scanning was performed from the dome of the diaphragm to the pubic symphysis with non-ionic intravenous contrast material. Post contrast coronal MPR imaging was performed.  Dose reduction techniques were used. Dose information is transmitted to the ACR (American College of Radiology) NRDR (National Radiology Data Registry) which includes the Dose Index Registry.  PATIENT STATED HISTORY:(As transcribed by Technologist)  Patient c/o increased weakness. Pt has hx of prior stroke and has left side deficit of weakness. Per E[facility], family stated that pt can usually walk with assist, but not today. Family state that pt is answering questions correctly, but is more weak than usual.   CONTRAST USED:  100cc of Isovue 370  FINDINGS:  LIVER:  No enlargement, atrophy, abnormal density, or significant focal lesion.  BILIARY:  There is density in the dependent part of the gallbladder consistent with noncalcified cholelithiasis or gallbladder sludge.  There is no CT evidence of cholecystitis. PANCREAS:  No lesion, fluid collection, ductal dilatation, or atrophy.  SPLEEN:  No enlargement or focal lesion.  KIDNEYS:  Scattered benign cysts are noted in both kidneys.  Nonobstructing 6 mm calcification lower pole left kidney is noted.  Nonobstructing 3 mm calcification upper pole right kidney is noted. ADRENALS:  No mass or enlargement.  AORTA/VASCULAR:  Severe aortic atherosclerosis with irregular ulcerated plaque is noted. RETROPERITONEUM:  No mass or adenopathy.  BOWEL/MESENTERY:  No visible mass, obstruction, or bowel wall thickening.  ABDOMINAL WALL:  No mass or hernia.  URINARY BLADDER:  There are dependent stones in the bladder measuring 0.8 and 0.5 cm. PELVIC NODES:  No adenopathy.  PELVIC ORGANS:  No visible mass.  Pelvic organs appropriate for patient age.  BONES:  No bony lesion or fracture.  LUNG BASES:  There is subpleural reticulation with suggestion of traction  bronchiectasis.  This could indicate interstitial lung disease or dependent atelectasis. OTHER:  Negative.             CONCLUSION:  1. Specific etiology for infection and lactic acidosis is not detected on this study. 2. Severe aortic atherosclerosis with plaque ulceration is noted.  There is no specific stenosis detected. 3. Density in the dependent part of the gallbladder could represent noncalcified cholelithiasis or gallbladder sludge. 4. Bilateral nonobstructing nephrolithiasis. 5. Dependent bladder calcifications are noted. 6. Reticular densities with possible traction bronchiectasis in lower lungs could represent interstitial lung disease or be in part related to dependent atelectasis.  If indicated follow-up high-resolution chest CT would be more specific.    LOCATION:  [facility]   Dictated by (CST): Saleem Harrison MD on 8/12/2024 at 7:38 PM     Finalized by (CST): Saleem Harrison MD on 8/12/2024 at 7:43 PM       CT BRAIN OR HEAD (CPT=70450)    Result Date: 8/12/2024  PROCEDURE:  CT BRAIN OR HEAD (86017)  COMPARISON:  [facility] , CT, CT BRAIN OR HEAD (04140), 5/10/2021, 5:24 PM.  INDICATIONS:  weakness  TECHNIQUE:  Noncontrast CT scanning is performed through the brain. Dose reduction techniques were used. Dose information is transmitted to the ACR (American College of Radiology) NRDR (National Radiology Data Registry) which includes the Dose Index Registry.  PATIENT STATED HISTORY: (As transcribed by Technologist)  Patient is here for increased weakness. Patient has a hx of stroke w/left sided deficits.   FINDINGS: Mild global brain parenchymal volume loss without overt hydrocephalus.  There is no midline shift or mass-effect.  The basal cisterns are patent.  The gray-white matter differentiation is intact.  There is mild to moderate scattered subarachnoid hemorrhage most pronounced along the anterior inferior right frontal sulci, left parietal sulci, and minimally along the right sylvian fissure.  CTA of the head  is suggested for further evaluation.  Thin subdural hematoma along the anterior right frontal lobe measuring up to 2 mm in thickness.  Stable mild chronic microvascular ischemic changes in the cerebral white matter.  Scattered vascular calcifications.  There is no evident fracture.  Minimal ethmoid mucosal thickening.  The mastoid air cells are unremarkable.  Bilateral intra-ocular lens implants.             CONCLUSION:   1. There is mild to moderate scattered subarachnoid hemorrhage most pronounced along the anterior inferior right frontal sulci, left parietal sulci, and minimally along the right sylvian fissure.  CTA of the head is suggested for further evaluation.  2. Thin subdural hematoma along the anterior right frontal lobe measuring up to 2 mm in thickness.  3. Stable mild chronic microvascular ischemic changes in the cerebral white matter.  If there is clinical concern for acute ischemia/infarction, an MRI of the brain would be recommended for further evaluation.  Critical results were discussed with Dr. Torres at 1733 hours on 8/12/2024. Critical results were read back.  LOCATION:  [id number]   Dictated by (CST): Joseph Domínguez MD on 8/12/2024 at 5:30 PM     Finalized by (CST): Joseph Domínguez MD on 8/12/2024 at 5:36 PM       XR CHEST AP PORTABLE  (CPT=71045)    Result Date: 8/12/2024  PROCEDURE:  XR CHEST AP PORTABLE  (CPT=71045)  TECHNIQUE:  AP chest radiograph was obtained.  COMPARISON:  None.  INDICATIONS:  weakness  PATIENT STATED HISTORY: (As transcribed by Technologist)  Patient stated feeling weak today.    FINDINGS:  Calcified plaque in the tortuous thoracic aorta.  Borderline heart size with normal pulmonary vascularity.  Mild scarring/atelectasis in the lower lungs.  No pleural effusion or pneumothorax.  Degenerative changes in the spine.            CONCLUSION:  No lobar pneumonia or overt congestive failure.  Mild scarring/atelectasis in the lower lungs.   LOCATION:  [id number]      Dictated by (CST):  Joseph Domínguez MD on 8/12/2024 at 5:00 PM     Finalized by (CST): Joseph Domínguez MD on 8/12/2024 at 5:01 PM          Assessment/Plan:     79 yr old male with PMH sig for CVA, DM II, HTN, and HLD who presented to the ED for evaluation of weakness and diarrhea.    # Subarachnoid hemorrhage   # Subdural hematoma   - unclear etiology   - NSGY and neuroCC c/s appreciated   - neuro checks and BP parameters per neuroCC  - cont keppra for seizure prophy   - PT/OT/speech    # Lactic acidosis  - cont IVFs  - trend    # Essential HTN  - hold home BP meds  - maintain BP parameters per neuroCC and NSGY    # HLD  - cont statin    # DM II with hyperglycemia   - hold po DM meds  - ISS with accuchecks     # Hx of CVA  # Residual R sided hemiparesis   - hold plavix given SAH/SDH  - PT/OT/speech    DVT prophy - SCD  Dispo: inpt care in the neuroICU.  POC d/w pt's family     Outpatient records or previous hospital records reviewed.   [facility] hospitalist to continue to follow patient while in house  A total of 76 minutes taken with patient and coordinating care.  Greater than 50% face to face encounter.      Advanced Care Planning  While discussing goals of care with pt, Raymond voluntarily participated in an advanced care planning discussion.  Additionally, his son in law Virgil participated in the conversation.  The following was discussed: POA and code status.  Virgil states that the pt does not have an official healthcare POA but are interested in obtaining one.  He confirms FULL CODE status.    17 minutes were spent discussing advanced care planning.  This time was exclusive of the documented time for this visit.     Stevan Pizarro DO  [facility] Hospitalist

## 2024-08-13 NOTE — PROGRESS NOTES
[facility]  Neurocritical Care APRN Progress Note    NAME: Raymond Smith - ROOM: D0/D0 - MRN: [id number] - Age: 79 year old - :3/28/1945    History Of Present Illness:  Raymond Smtih is a 79 year old male with PMHx significant for CVA, DM, HTN, and HL who presented to the ED today w/ c/o weakness and diarrhea.  At baseline pt has residual R hemiparesis, facial droop,a and slurred speech after his CVA however today he was unable to get out of bed.  Pt was brought to the ED where he had a CT brain which showed scattered SAH and a thin R acute SDH.  Pt had a CTA negative for aneurysm.  Pt was transferred to the CNICU for further management.  Family at bedside denies recent falls.  He is at his neurologic baseline.    Past Medical History:  Past Medical History:    CVA (cerebral vascular accident) (HCC)    Diabetes (HCC)    Essential hypertension     Past Surgical History:   Past Surgical History:   Procedure Laterality Date    Other surgical history      feeding tube      Family History:   No family history on file.  Social History:    reports that he has never smoked. He has never used smokeless tobacco. He reports that he does not drink alcohol and does not use drugs.     Review of Systems:   A comprehensive 10 point review of systems was completed.  Pertinent positives and negatives noted in the HPI.    Current Facility-Administered Medications   Medication Dose Route Frequency    sodium chloride 0.9 % IV bolus 2,259 mL  30 mL/kg Intravenous Once     OBJECTIVE  Vitals:  /85   Pulse 104   Temp 97.6 °F (36.4 °C) (Oral)   Resp 19   Wt 166 lb 0.1 oz (75.3 kg)   SpO2 95%   BMI 24.51 kg/m²           Physical Exam:    General Appearance: Alert, cooperative, no distress, appears stated age  Neck: Supple, symmetrical, trachea midline, no carotid bruits, adenopathy, or JVD  Lungs: Clear to auscultation bilaterally, respirations unlabored  Heart: Regular rate and rhythm, S1 and S2 normal, no  murmur, rub or gallop  Abdomen: Soft, non-tender, bowel sounds active all four quadrants, no masses, no organomegaly  Extremities: Extremities normal, atraumatic, no cyanosis or edema, capillary refill <3 sec.    Pulses: 2+ and symmetric all extremities  Skin: Skin color, texture, turgor normal for ethnicity, no rashes or lesions, warm and dry  Neurologic: This patient is alert and orientated x 3.  Speech mildly dysarthric. Pupils equally round and reactive to light.  3+ brisk bilaterally.  EOMs intact.  Visual fields are full.  Tongue is midline. R facial droop. Uvula and palate elevate symmetrically.  Shrug shoulders normal bilaterally.  The rest of the cranial nerves are grossly intact.  Sensation to light touch is intact bilaterally.  Motor: Slight R drift. RUE/RLE 4/5, LUE/LLE 5/5.  Finger-to-nose coordination is intact.  Gait deferred.    Data this admission:  CT ABDOMEN+PELVIS(CONTRAST ONLY)(CPT=74177)    Result Date: 8/12/2024  CONCLUSION:  1. Specific etiology for infection and lactic acidosis is not detected on this study. 2. Severe aortic atherosclerosis with plaque ulceration is noted.  There is no specific stenosis detected. 3. Density in the dependent part of the gallbladder could represent noncalcified cholelithiasis or gallbladder sludge. 4. Bilateral nonobstructing nephrolithiasis. 5. Dependent bladder calcifications are noted. 6. Reticular densities with possible traction bronchiectasis in lower lungs could represent interstitial lung disease or be in part related to dependent atelectasis.  If indicated follow-up high-resolution chest CT would be more specific.    LOCATION:  [facility]   Dictated by (CST): Saleem Harrison MD on 8/12/2024 at 7:38 PM     Finalized by (CST): Saleem Harrison MD on 8/12/2024 at 7:43 PM       CT BRAIN OR HEAD (CPT=70450)    Result Date: 8/12/2024  CONCLUSION:   1. There is mild to moderate scattered subarachnoid hemorrhage most pronounced along the anterior inferior right  frontal sulci, left parietal sulci, and minimally along the right sylvian fissure.  CTA of the head is suggested for further evaluation.  2. Thin subdural hematoma along the anterior right frontal lobe measuring up to 2 mm in thickness.  3. Stable mild chronic microvascular ischemic changes in the cerebral white matter.  If there is clinical concern for acute ischemia/infarction, an MRI of the brain would be recommended for further evaluation.  Critical results were discussed with Dr. Torres at 1733 hours on 8/12/2024. Critical results were read back.  LOCATION:  [id number]   Dictated by (CST): Joseph Domínguez MD on 8/12/2024 at 5:30 PM     Finalized by (CST): Joseph Domínguez MD on 8/12/2024 at 5:36 PM       XR CHEST AP PORTABLE  (CPT=71045)    Result Date: 8/12/2024  CONCLUSION:  No lobar pneumonia or overt congestive failure.  Mild scarring/atelectasis in the lower lungs.   LOCATION:  [id number]      Dictated by (CST): Joseph Domínguez MD on 8/12/2024 at 5:00 PM     Finalized by (CST): Joseph Domínguez MD on 8/12/2024 at 5:01 PM       Labs:  Lab Results   Component Value Date    WBC 15.9 08/12/2024    HGB 15.9 08/12/2024    HCT 51.6 08/12/2024    .0 08/12/2024    CREATSERUM 1.61 08/12/2024    BUN 23 08/12/2024     08/12/2024    K 4.1 08/12/2024     08/12/2024    CO2 25.0 08/12/2024     08/12/2024    CA 11.8 08/12/2024    ALB 5.4 08/12/2024    ALKPHO 60 08/12/2024    BILT 1.2 08/12/2024    TP 8.8 08/12/2024    AST 15 08/12/2024    ALT 13 08/12/2024    PTT 23.7 08/12/2024    INR 1.01 08/12/2024    MG 2.2 08/12/2024     Assessment/Plan:  tSAH/SDH  -Neuro checks q 1 hr  -Neurosurgery on consult  -Repeat CT brain in am or sooner if pt has significant clinical changes  -Keppra for seizure prophylaxis  -PRN Hydralazine/Labetalol to keep SBP < 150  -ST/PT/OT evals  -Tylenol PRN  Lactic acidosis  -Trend  -S/p sepsis fluid resuscitation  Diarrhea  DM  -Accuchecks  HL  -Continue home statin  Previous CVA with  residual R hemiparesis    F/E/N:  -IV fluids  -Follow lytes and replete prn  -NPO pending swallow eval    ABCDEF Bundle  A- Assess, prevent and manage pain--prn pain medications  B- Spontaneous breathing trials--NA  C- Sedation--RASS 0  D- Delirium--CAM-ICU negative  E- Early mobility--Bedrest   F- Family engagement--Update as available  G- Central Line/ Chavira--None    Proph:  -No a/c at this time d/t evidence of bleeding  -SCD    Dispo:     Code Status: Not on file  -Neuro ICU monitoring    Plan of care discussed with Neuro-Intensivist On-Call, SHREYA Rojas  Critical Care  8/12/2024  9:48 PM         A total of 35 minutes of critical care time (exclusive of billable procedures) was administered. This involved direct patient intervention, complex decision making, and/or extensive discussions with the patient, family, and clinical staff.    The 21st Century Cures Act makes medical notes like these available to patients in the interest of transparency. Please be advised this is a medical document. Medical documents are intended to carry relevant information, facts as evident, and the clinical opinion of the practitioner. The medical note is intended as peer to peer communication and may appear blunt or direct. It is written in medical language and may contain abbreviations or verbiage that are unfamiliar.

## 2024-08-13 NOTE — SLP NOTE
ADULT SWALLOWING EVALUATION    ASSESSMENT    ASSESSMENT/OVERALL IMPRESSION:  Patient seen for swallowing evaluation per protocol. Patient presented to ED due to increased weakness. Family at bedside, supportive, contributed to history and assisted with communication as needed. Patient with history of stroke within the past few years with residual right sided facial weakness leading to dysarthria. Family at bedside notes patient does tend to cough with swallowing at times but has not had any respiratory difficulty or pneumonia. Patient is alert, able to follow commands, very cooperative and pleasant throughout. Family notes patient does tend to eat quickly and is able to feed himself at baseline. Family notes he is at his baseline cognitive communication status.    Oral mechanism exam remarkable for reduced right facial tone, strength and ROM. Patient also with failing dental implants as well as edentulous status. Patient with intact oral retrieval but with intermittent anterior loss of liquids. Oral prep and transit appeared impaired with suspected intermittent reduced oral bolus control, particularly with liquids. Mastication of soft solids was prolonged but adequate with complete oral clearance. Laryngeal excursion judged to be of variable strength and timeliness to palpation. Patient also somewhat distractible which impacted timeliness of oral and pharyngeal phases. There was intermittent immediate and delayed cough throughout, though mostly noted after thin liquids.    Discussed all above in detail with patient son in law at bedside regarding noted chronic cough with swallowing since stroke and risk for respiratory complications associated with potential aspiration events. Discussed availability of  further workup should patient and family be inclined. Discussed patient cough may be effective in clearing potential penetration/aspiration given lack of respiratory complications. Discussed importance of slow rate,  small bites and sips, regular oral care, mobility as able to reduce risk of potential complications associated with any aspiration events. All questions answered. Updated RN. Will continue to follow.    Parsons Assessment of Swallow Function Score: No abnormality detected (185)    RECOMMENDATIONS   Diet Recommendations - Solids: Soft/ Easy to chew  Diet Recommendations - Liquids: Thin Liquids (no straws)                        Compensatory Strategies Recommended: No straws;Slow rate;Small bites and sips (1:1 feeding assist)  Aspiration Precautions: Upright position;Slow rate;Small bites and sips;No straw  Medication Administration Recommendations: One pill at a time  Treatment Plan/Recommendations: Aspiration precautions    HISTORY   MEDICAL HISTORY  Reason for Referral: Stroke protocol    Problem List  Principal Problem:    Subarachnoid hemorrhage (HCC)  Active Problems:    Acute subdural hematoma (HCC)    Diarrhea, unspecified type    Hyperglycemia    Lactic acidosis    Sinus tachycardia    Type 2 diabetes mellitus without complication, without long-term current use of insulin (HCC)    Hyperlipidemia    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  Stable subarachnoid hemorrhage along the right frontal lobe, right sylvian fissure, and left parietal lobe.      2. Stable 2 mm right anterior frontal subdural hemorrhage.            LOCATION:  [facility]          Dictated by (CST): Zoltan Rowe MD on 8/13/2024 at 5:45 AM       Finalized by (CST): Zoltan Rowe MD on 8/13/2024 at 5:48 AM       CXR from 8/12/24 revealed:  Impression   CONCLUSION:  No lobar pneumonia or overt congestive failure.  Mild scarring/atelectasis in the lower lungs.        LOCATION:  [id number]                 Dictated by (CST): Joseph Domínguez MD on 8/12/2024 at 5:00 PM      Finalized by (CST): Joseph Domínguez MD on 8/12/2024 at 5:01 PM       SUBJECTIVE       OBJECTIVE   ORAL MOTOR EXAMINATION  Dentition: Edentulous (implants in poor repair, missing some teeth)  Symmetry: Reduced right facial  Strength: Reduced right facial  Tone: Reduced right facial  Range of Motion: Reduced right facial  Rate of Motion: Reduced    Voice Quality: Clear  Respiratory Status: Unlabored  Consistencies Trialed: Thin liquids;Puree;Soft solid  Method of Presentation: Self presentation;Staff/Clinician assistance;Cup;Single sips  Patient Positioning: Upright;Midline (in bed)    Oral Phase of Swallow: Impaired  Bolus Retrieval: Intact  Bilabial Seal: Impaired  Bolus Formation: Intact  Bolus Propulsion: Intact  Mastication: Intact  Retention: Intact    Pharyngeal Phase of Swallow: Impaired  Laryngeal Elevation Timing: Appears impaired  Laryngeal Elevation Strength: Appears impaired (variable strength to palpation)  Laryngeal Elevation Coordination: Appears intact  (Please note: Silent aspiration cannot be evaluated clinically. Videofluoroscopic Swallow Study is required to rule-out silent aspiration.)    Esophageal Phase of Swallow: No complaints consistent with possible esophageal involvement              GOALS  Goal #1 The patient will tolerate soft/easy to chew consistency and thin liquids without overt signs or symptoms of aspiration with 95 % accuracy over 1-2 session(s).  In Progress   Goal #2 The patient/family/caregiver will demonstrate understanding and implementation of aspiration precautions and swallow strategies  independently over 1-2 session(s).    In Progress     FOLLOW UP  Treatment Plan/Recommendations: Aspiration precautions  Number of Visits to Meet Established Goals: 2  Follow Up Needed (Documentation Required): Yes  SLP Follow-up Date: 08/14/24    Thank you for your referral.   If you have any questions, please contact John Stone MS CCC-SLP  Pager [phone]

## 2024-08-13 NOTE — OCCUPATIONAL THERAPY NOTE
Received OT evaluation order.   Bedrest, neurosurgery consultation pending.  OT will evaluate the patient once activity level is upgraded.

## 2024-08-14 ENCOUNTER — APPOINTMENT (OUTPATIENT)
Dept: CV DIAGNOSTICS | Facility: HOSPITAL | Age: 79
End: 2024-08-14
Attending: INTERNAL MEDICINE
Payer: MEDICAID

## 2024-08-14 ENCOUNTER — APPOINTMENT (OUTPATIENT)
Dept: ULTRASOUND IMAGING | Facility: HOSPITAL | Age: 79
End: 2024-08-14
Attending: INTERNAL MEDICINE
Payer: MEDICAID

## 2024-08-14 LAB
ANION GAP SERPL CALC-SCNC: 5 MMOL/L (ref 0–18)
BASOPHILS # BLD AUTO: 0.06 X10(3) UL (ref 0–0.2)
BASOPHILS NFR BLD AUTO: 0.5 %
BUN BLD-MCNC: 28 MG/DL (ref 9–23)
CALCIUM BLD-MCNC: 9.5 MG/DL (ref 8.7–10.4)
CHLORIDE SERPL-SCNC: 114 MMOL/L (ref 98–112)
CO2 SERPL-SCNC: 27 MMOL/L (ref 21–32)
CREAT BLD-MCNC: 1.05 MG/DL
EGFRCR SERPLBLD CKD-EPI 2021: 72 ML/MIN/1.73M2 (ref 60–?)
EOSINOPHIL # BLD AUTO: 0.04 X10(3) UL (ref 0–0.7)
EOSINOPHIL NFR BLD AUTO: 0.3 %
ERYTHROCYTE [DISTWIDTH] IN BLOOD BY AUTOMATED COUNT: 12.5 %
GLUCOSE BLD-MCNC: 132 MG/DL (ref 70–99)
GLUCOSE BLD-MCNC: 177 MG/DL (ref 70–99)
GLUCOSE BLD-MCNC: 191 MG/DL (ref 70–99)
GLUCOSE BLD-MCNC: 208 MG/DL (ref 70–99)
GLUCOSE BLD-MCNC: 279 MG/DL (ref 70–99)
HCT VFR BLD AUTO: 37.3 %
HGB BLD-MCNC: 11.9 G/DL
IMM GRANULOCYTES # BLD AUTO: 0.06 X10(3) UL (ref 0–1)
IMM GRANULOCYTES NFR BLD: 0.5 %
LYMPHOCYTES # BLD AUTO: 1.3 X10(3) UL (ref 1–4)
LYMPHOCYTES NFR BLD AUTO: 9.8 %
MCH RBC QN AUTO: 32.2 PG (ref 26–34)
MCHC RBC AUTO-ENTMCNC: 31.9 G/DL (ref 31–37)
MCV RBC AUTO: 101.1 FL
MONOCYTES # BLD AUTO: 0.71 X10(3) UL (ref 0.1–1)
MONOCYTES NFR BLD AUTO: 5.4 %
NEUTROPHILS # BLD AUTO: 11.07 X10 (3) UL (ref 1.5–7.7)
NEUTROPHILS # BLD AUTO: 11.07 X10(3) UL (ref 1.5–7.7)
NEUTROPHILS NFR BLD AUTO: 83.5 %
OSMOLALITY SERPL CALC.SUM OF ELEC: 309 MOSM/KG (ref 275–295)
PLATELET # BLD AUTO: 168 10(3)UL (ref 150–450)
POTASSIUM SERPL-SCNC: 3.6 MMOL/L (ref 3.5–5.1)
RBC # BLD AUTO: 3.69 X10(6)UL
SODIUM SERPL-SCNC: 146 MMOL/L (ref 136–145)
WBC # BLD AUTO: 13.2 X10(3) UL (ref 4–11)

## 2024-08-14 PROCEDURE — 93886 INTRACRANIAL COMPLETE STUDY: CPT | Performed by: INTERNAL MEDICINE

## 2024-08-14 PROCEDURE — 93306 TTE W/DOPPLER COMPLETE: CPT | Performed by: INTERNAL MEDICINE

## 2024-08-14 PROCEDURE — 99291 CRITICAL CARE FIRST HOUR: CPT | Performed by: NEUROLOGICAL SURGERY

## 2024-08-14 PROCEDURE — 99291 CRITICAL CARE FIRST HOUR: CPT | Performed by: INTERNAL MEDICINE

## 2024-08-14 RX ORDER — LEVETIRACETAM 500 MG/1
500 TABLET ORAL 2 TIMES DAILY
Status: DISCONTINUED | OUTPATIENT
Start: 2024-08-14 | End: 2024-08-16

## 2024-08-14 RX ORDER — LOSARTAN POTASSIUM 50 MG/1
50 TABLET ORAL DAILY
Status: DISCONTINUED | OUTPATIENT
Start: 2024-08-14 | End: 2024-08-16

## 2024-08-14 NOTE — CM/SW NOTE
Therapy recommendations for intense--PM&R order obtained--[facility]IN referral sent to [facility] for physiatry evaluation

## 2024-08-14 NOTE — PLAN OF CARE
Pt received aox4, resting in bed.   Neuros q2; no change from yesterday.   Dr Mosqueda rounded; advance to q4 neuros and ctu orders.   Dr Sosa rounded; spoke to family about doing a cerebral angio on patient, patient does not want to due to how miserable he felt during MRI, kept pt NPO per Dr Sosa until final decision confirmed. Patient ultimately decided not to go forward with procedure so diet resumed.   Pt able to get up to chair using Sarastedy x2.  Pt had good appetite and fluid intake.  Seen by Luis Alberto SLP; adjusted diet orders, ok to drink with straws for now.    Problem: NEUROLOGICAL - ADULT  Goal: Achieves stable or improved neurological status  Description: INTERVENTIONS  - Assess for and report changes in neurological status  - Initiate measures to prevent increased intracranial pressure  - Maintain blood pressure and fluid volume within ordered parameters to optimize cerebral perfusion and minimize risk of hemorrhage  - Monitor temperature, glucose, and sodium. Initiate appropriate interventions as ordered  Outcome: Progressing  Goal: Absence of seizures  Description: INTERVENTIONS  - Monitor for seizure activity  - Administer anti-seizure medications as ordered  - Monitor neurological status  Outcome: Progressing  Goal: Remains free of injury related to seizure activity  Description: INTERVENTIONS:  - Maintain airway, patient safety  and administer oxygen as ordered  - Monitor patient for seizure activity, document and report duration and description of seizure to MD/LIP  - If seizure occurs, turn patient to side and suction secretions as needed  - Reorient patient post seizure  - Seizure pads on all 4 side rails  - Instruct patient/family to notify RN of any seizure activity  - Instruct patient/family to call for assistance with activity based on assessment  Outcome: Progressing  Goal: Achieves maximal functionality and self care  Description: INTERVENTIONS  - Monitor swallowing and airway patency  with patient fatigue and changes in neurological status  - Encourage and assist patient to increase activity and self care with guidance from PT/OT  - Encourage visually impaired, hearing impaired and aphasic patients to use assistive/communication devices  Outcome: Progressing

## 2024-08-14 NOTE — PROGRESS NOTES
[facility]  Neurosurgery Progress Note    Raymond Smith Patient Status:  Inpatient    3/28/1945 MRN [id number]   Location [facility] 6NE-A Attending Stevan Pizarro, DO   Hosp Day # 2 PCP Lisa Wetzel MD     Chief Complaint:  ICH, SAH, SDH    Subjective:  There is no evidence of vascular malformation on MRI/MRA to explain ICH. No acute events overnight.     Objective/Physical Exam:    Vital Signs:  Blood pressure 136/71, pulse 68, temperature 97.7 °F (36.5 °C), resp. rate 16, weight 131 lb 13.4 oz (59.8 kg), SpO2 94%.  Respiratory:  nonlabored  CV:  well perfused  General: NAD, Able to speak with family in Spanish  Neurologic:   A&Ox3  PERRL, EOMi, Right droop  Full strength x 4, no drift      Labs:  Recent Labs   Lab 24  1604 24  0439 24  0430   RBC 4.87 4.41 3.69*   HGB 15.9 14.2 11.9*   HCT 51.6 44.4 37.3*   .0* 100.7* 101.1*   MCH 32.6 32.2 32.2   MCHC 30.8* 32.0 31.9   RDW 12.3 12.6 12.5   NEPRELIM 14.79* 14.90* 11.07*   WBC 15.9* 16.1* 13.2*   .0 199.0 168.0       Recent Labs   Lab 24  1604 24  0439 24  0431   * 207* 132*   BUN 23 15 28*   CREATSERUM 1.61* 1.17 1.05   EGFRCR 43* 63 72   CA 11.8* 10.4 9.5    151* 146*   K 4.1 4.0 3.6    119* 114*   CO2 25.0 22.0 27.0       Imaging:  MRA BRAIN (W+WO) (JXD=82371)    Result Date: 2024  CONCLUSION:  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  There is mild-to-moderate narrowing of M1 and M2 branches of the middle cerebral arteries and V4 segment of the left vertebral artery.  No evidence for aneurysm intracranially.    LOCATION:  [facility]   Dictated by (CST): Shruit Byrnes MD on 2024 at 7:42 PM     Finalized by (CST): Shruti Byrnes MD on 2024 at 7:48 PM       MRI BRAIN (W+WO) (CPT=70553)    Result Date: 2024  CONCLUSION:  Punctate focus of restricted diffusion in the left frontal lobe cortex  may represent a small focus of acute infarction.  There is curvy linear increased signal in the left parietal lobe and right frontal lobe corresponding to areas of acute subarachnoid hemorrhage.  2 mm subdural hematoma overlying the right frontal lobe.  No significant mass effect or midline shift.  Critical exam results phoned to nurse Keller on 8/13/2024 at 1940 hours with read back.   LOCATION:  [facility]    Dictated by (CST): Shruti Byrnes MD on 8/13/2024 at 7:32 PM     Finalized by (CST): Shruti Byrnes MD on 8/13/2024 at 7:42 PM       CT BRAIN OR HEAD (CPT=70450)    Result Date: 8/13/2024  CONCLUSION:   1.  Stable subarachnoid hemorrhage along the right frontal lobe, right sylvian fissure, and left parietal lobe.  2. Stable 2 mm right anterior frontal subdural hemorrhage.    LOCATION:  [facility]   Dictated by (CST): Zoltan Rowe MD on 8/13/2024 at 5:45 AM     Finalized by (CST): Zoltan Rowe MD on 8/13/2024 at 5:48 AM       CTA BRAIN + CTA CAROTIDS (CPT=70496/40195)    Result Date: 8/12/2024  CONCLUSION:   1. Stable mild to moderate scattered subarachnoid hemorrhage most pronounced along the anterior inferior right frontal sulci, left parietal sulci, and minimally along the right sylvian fissure.   2. Stable thin subdural hematoma along the anterior right frontal lobe measuring up to 2 mm in thickness.  3. Stable mild chronic microvascular ischemic changes in the cerebral white matter.  If there is clinical concern for acute ischemia/infarction, an MRI of the brain would be recommended for further evaluation.  4. There is moderate atherosclerotic irregularity and narrowing in the M1 and M2 branches of the right middle cerebral artery.  There is mild-to-moderate atherosclerotic irregularity and narrowing in the distal M1 and proximal M2 branches of the left middle cerebral artery.   5. There is moderate atherosclerotic irregularity and narrowing at the origin of the right vertebral artery due to mixed  atherosclerotic plaque.  There is scattered mild plaque along the course of the right cervical vertebral artery with approximately 50% narrowing in the distal V2 segment noted.  There is chronic appearing occlusion of the V3 segment extending into the V4 segment of the right vertebral artery.  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   6. There is severe atherosclerotic narrowing in the V1 segment of the left vertebral artery due to mixed predominately noncalcified atherosclerotic plaque.  There is mild-to-moderate scattered atherosclerotic plaque along the course of the V4 segment of the left vertebral artery without hemodynamically significant narrowing.  7. There is approximately 50% stenosis in the distal supraclinoid segment of the right internal carotid artery due to mixed plaque.   8. There is ectasia of the ascending aorta measuring up to 4 cm in diameter which could be further assessed with gated CTA of the thoracic aorta as clinically directed.  9. No evidence of occlusion, dissection, or flow-limiting stenosis in the cervical carotid arteries. No evidence of hemodynamically significant carotid stenosis by NASCET criteria.  Please see above for further details.  LOCATION:  [id number]   Dictated by (CST): Joseph Domínguez MD on 8/12/2024 at 8:06 PM     Finalized by (CST): Joseph Domínguez MD on 8/12/2024 at 8:21 PM       CT ABDOMEN+PELVIS(CONTRAST ONLY)(CPT=74177)    Result Date: 8/12/2024  CONCLUSION:  1. Specific etiology for infection and lactic acidosis is not detected on this study. 2. Severe aortic atherosclerosis with plaque ulceration is noted.  There is no specific stenosis detected. 3. Density in the dependent part of the gallbladder could represent noncalcified cholelithiasis or gallbladder sludge. 4. Bilateral nonobstructing nephrolithiasis. 5. Dependent bladder  calcifications are noted. 6. Reticular densities with possible traction bronchiectasis in lower lungs could represent interstitial lung disease or be in part related to dependent atelectasis.  If indicated follow-up high-resolution chest CT would be more specific.    LOCATION:  [facility]   Dictated by (CST): Saleem Harrison MD on 8/12/2024 at 7:38 PM     Finalized by (CST): Saleem Harrison MD on 8/12/2024 at 7:43 PM       CT BRAIN OR HEAD (CPT=70450)    Result Date: 8/12/2024  CONCLUSION:   1. There is mild to moderate scattered subarachnoid hemorrhage most pronounced along the anterior inferior right frontal sulci, left parietal sulci, and minimally along the right sylvian fissure.  CTA of the head is suggested for further evaluation.  2. Thin subdural hematoma along the anterior right frontal lobe measuring up to 2 mm in thickness.  3. Stable mild chronic microvascular ischemic changes in the cerebral white matter.  If there is clinical concern for acute ischemia/infarction, an MRI of the brain would be recommended for further evaluation.  Critical results were discussed with Dr. Torres at 1733 hours on 8/12/2024. Critical results were read back.  LOCATION:  [id number]   Dictated by (CST): Joseph Domínguez MD on 8/12/2024 at 5:30 PM     Finalized by (CST): Joseph Domínguez MD on 8/12/2024 at 5:36 PM       XR CHEST AP PORTABLE  (CPT=71045)    Result Date: 8/12/2024  CONCLUSION:  No lobar pneumonia or overt congestive failure.  Mild scarring/atelectasis in the lower lungs.   LOCATION:  [id number]      Dictated by (CST): Joseph Domínguez MD on 8/12/2024 at 5:00 PM     Finalized by (CST): Joseph Domínguez MD on 8/12/2024 at 5:01 PM           Assessment:  78 yo male with SAH and anterior frontal hemorrhage    Dr Sosa at bedside to discuss the option of cerebral angiogram to definitively rule out vascular abnormalities as a source of bleed. After discussion, the patient and family elected to not do a cerebral angiogram    Plan:  No  angiogram planned  Will follow up with out patient head CT in clinic  OK to transfer to step down  Q4 Neuro checks  Continue to hold Plavix until outpatient follow up  DVT prophylaxis SCD    Is this a shared or split note between Advanced Practice Provider and Physician? Yes       EAN Pruett  [facility]  8/14/2024, 8:23 AM  Spectre [id number]

## 2024-08-14 NOTE — PROGRESS NOTES
[facility]   part of [facility] Hospitalist Progress Note     Raymond Smith Patient Status:  Inpatient    3/28/1945 MRN [id number]   Location [facility] 6NE-A Attending Stevan Pizarro, DO   Hosp Day # 2 PCP Lisa Wetzel MD     Follow Up:  The primary encounter diagnosis was Subarachnoid hemorrhage (HCC). Diagnoses of Acute subdural hematoma (HCC), Diarrhea, unspecified type, Hyperglycemia, Lactic acidosis, and Sinus tachycardia were also pertinent to this visit.    Subjective:     Patient seen and examined.  No complaints, denies HA, F/C, N/V.     Objective:    Review of Systems:   10 point ROS completed and was negative, except for pertinent positive and negatives stated in subjective.    Vital signs:  Temp:  [97.2 °F (36.2 °C)-98.8 °F (37.1 °C)] 98.1 °F (36.7 °C)  Pulse:  [54-96] 57  Resp:  [13-19] 16  BP: (111-144)/(53-78) 136/78  SpO2:  [94 %-97 %] 96 %    Physical Exam:    Gen: +dysarthria.  Aox 3.  R facial droop.  Responds well to commands.     HEENT:  EOMI, PERRLA, OP clear, MMM  Pulm: Lungs clear bilaterally, normal respiratory effort  CV: Tachy, reg, no murmur.  Normal PMI.    Abd: Abdomen soft, nontender, nondistended, no organomegaly, bowel sounds present  MSK: Full range of motion in extremities, no clubbing, no cyanosis  Skin: no rashes or lesions  Neuro:  RUE/RLE 4/5 strength       Diagnostic Data:    Labs:  Recent Labs   Lab 24  1604 24  0439 24  0430   WBC 15.9* 16.1* 13.2*   HGB 15.9 14.2 11.9*   .0* 100.7* 101.1*   .0 199.0 168.0   INR 1.01  --   --        Recent Labs   Lab 24  1604 24  0439 24  0431   * 207* 132*   BUN 23 15 28*   CREATSERUM 1.61* 1.17 1.05   CA 11.8* 10.4 9.5   ALB 5.4*  --   --     151* 146*   K 4.1 4.0 3.6    119* 114*   CO2 25.0 22.0 27.0   ALKPHO 60  --   --    AST 15  --   --    ALT 13  --   --    BILT 1.2*  --   --    TP 8.8*  --   --        CrCl  cannot be calculated (Unknown ideal weight.).    Recent Labs   Lab 08/12/24  1604   PTP 13.3   INR 1.01            COVID-19 Lab Results    COVID-19  Lab Results   Component Value Date    COVID19 Not Detected 08/12/2024    COVID19 Not Detected 05/10/2021       Pro-Calcitonin  No results for input(s): \"PCT\" in the last 168 hours.    Cardiac  Recent Labs   Lab 08/12/24  1604   PBNP 3,801*       Creatinine Kinase  No results for input(s): \"CK\" in the last 168 hours.    Inflammatory Markers  No results for input(s): \"CRP\", \"SAMAN\", \"LDH\", \"DDIMER\" in the last 168 hours.    Imaging: Imaging data reviewed in Epic.    Medications:    levETIRAcetam  500 mg Oral BID    losartan  50 mg Oral Daily    atorvastatin  20 mg Oral Nightly    donepezil  10 mg Oral Nightly    insulin aspart  1-5 Units Subcutaneous TID AC and HS       Assessment & Plan:      79 yr old male with PMH sig for CVA, DM II, HTN, and HLD who presented to the ED for evaluation of weakness and diarrhea.     # Subarachnoid hemorrhage   # Subdural hematoma   - unclear etiology   - NSGY and neuroCC c/s appreciated   - neuro checks and BP parameters per neuroCC  - cont keppra for seizure prophy   - PT/OT/speech  - repeat CT head as outpt     # Lactic acidosis  - cont IVFs  - trend     # Essential HTN  - hold home BP meds  - maintain BP parameters per neuroCC and NSGY     # HLD  - cont statin     # DM II with hyperglycemia   - hold po DM meds  - ISS with accuchecks      # Hx of CVA  # Residual R sided hemiparesis   - hold plavix given SAH/SDH until outpt f/u  - PT/OT/speech-->PMR-->acute rehab      Plan of care: inpt care in the neuroICU.  Stable for transfer to the floor.    Plan of care discussed with patient or family at bedside.    Stevan Pizarro DO    Supplementary Documentation:     Quality:  DVT Prophylaxis: SCD  CODE status: FULL  Chavira: no  Central line: no  If COVID testing is negative, may discontinue isolation: yes     Estimated date of discharge:  TBD  Discharge is dependent on: clinical course   At this point Mr. Smith is expected to be discharge to: Acute rehab     Plan of care discussed with pt

## 2024-08-14 NOTE — PROGRESS NOTES
[facility]  Neurocritical Care Progress Note     Raymond Smith Patient Status:  Inpatient    3/28/1945 MRN [id number]   Location [facility] 6NE-A Attending Stevan Pizarro, DO   Hosp Day # 2 PCP Lisa Wetzel MD     Subjective:   Patient is a 79 year old male h/o htn, hl, dm2, stroke w/residual L HP p/w sah    Hospital course significant for noted by family to have generalized weakness thus brought to ED where w/u revealed ct head with R frontal and L parietal scattered sah and R frontal sdh, cta neg for vasc malformation, and pts family denies any recent head trauma     No acute events overnight.    Vitals:   Temp:  [97.2 °F (36.2 °C)-98.8 °F (37.1 °C)] 97.7 °F (36.5 °C)  Pulse:  [] 69  Resp:  [13-25] 19  BP: (111-140)/(55-77) 131/73  SpO2:  [94 %-100 %] 97 %  Body mass index is 19.47 kg/m².    Intake/Output:    Intake/Output Summary (Last 24 hours) at 2024 1107  Last data filed at 2024 0800  Gross per 24 hour   Intake 350 ml   Output 900 ml   Net -550 ml     Current Meds:  Current Facility-Administered Medications   Medication Dose Route Frequency    levETIRAcetam (Keppra) tab 500 mg  500 mg Oral BID    losartan (Cozaar) tab 50 mg  50 mg Oral Daily    atorvastatin (Lipitor) tab 20 mg  20 mg Oral Nightly    donepezil (Aricept) tab 10 mg  10 mg Oral Nightly    labetalol (Trandate) 5 mg/mL injection 10 mg  10 mg Intravenous Q10 Min PRN    hydrALAzine (Apresoline) 20 mg/mL injection 10 mg  10 mg Intravenous Q2H PRN    acetaminophen (Tylenol) tab 650 mg  650 mg Oral Q4H PRN    Or    acetaminophen (Tylenol) rectal suppository 650 mg  650 mg Rectal Q4H PRN    ondansetron (Zofran) 4 MG/2ML injection 4 mg  4 mg Intravenous Q6H PRN    Or    metoclopramide (Reglan) 5 mg/mL injection 5 mg  5 mg Intravenous Q8H PRN    midazolam (Versed) 2 MG/2ML injection 2 mg  2 mg Intravenous Q15 Min PRN    morphINE PF 2 MG/ML injection 1 mg  1 mg Intravenous Q2H PRN    Or     morphINE PF 2 MG/ML injection 2 mg  2 mg Intravenous Q2H PRN    Or    morphINE PF 4 MG/ML injection 4 mg  4 mg Intravenous Q2H PRN    glucose (Dex4) 15 GM/59ML oral liquid 15 g  15 g Oral Q15 Min PRN    Or    glucose (Glutose) 40% oral gel 15 g  15 g Oral Q15 Min PRN    Or    glucose-vitamin C (Dex-4) chewable tab 4 tablet  4 tablet Oral Q15 Min PRN    Or    dextrose 50% injection 50 mL  50 mL Intravenous Q15 Min PRN    Or    glucose (Dex4) 15 GM/59ML oral liquid 30 g  30 g Oral Q15 Min PRN    Or    glucose (Glutose) 40% oral gel 30 g  30 g Oral Q15 Min PRN    Or    glucose-vitamin C (Dex-4) chewable tab 8 tablet  8 tablet Oral Q15 Min PRN    insulin aspart (NovoLOG) 100 Units/mL FlexPen 1-5 Units  1-5 Units Subcutaneous TID AC and HS     Physical Examination:   General- No acute distress  CV- RRR  Resp- CTA Bilat  Neuro-  Mental status- lethargic but arousable to stim, briefly regards and follows commands  Cranial nerves- pupils equally round and reactive to light, extraocular muscles intact, +baseline R facial droop  Motor- noriega x4  Sens-  Intact to light touch       Diagnostics:   US TRANSCRANIAL ARTERIES COMPLETE  (CPT=93886)    Result Date: 8/14/2024  CONCLUSION:   No evidence of arterial vasospasm.   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  There is mild-to-moderate narrowing of M1 and M2 branches of the middle cerebral arteries and V4 segment of the left vertebral artery.  No evidence for aneurysm intracranially.    LOCATION:  [facility]   Dictated by (CST): Shruti Byrnes MD on 8/13/2024 at 7:42 PM     Finalized by (CST): Shruti Byrnes MD on 8/13/2024 at 7:48 PM       MRI BRAIN (W+WO) (CPT=70553)    Result Date: 8/13/2024  CONCLUSION:  Punctate  focus of restricted diffusion in the left frontal lobe cortex may represent a small focus of acute infarction.  There is curvy linear increased signal in the left parietal lobe and right frontal lobe corresponding to areas of acute subarachnoid hemorrhage.  2 mm subdural hematoma overlying the right frontal lobe.  No significant mass effect or midline shift.  Critical exam results phoned to nurse Arianna on 8/13/2024 at 1940 hours with read back.   LOCATION:  [facility]    Dictated by (CST): Shruti Byrnes MD on 8/13/2024 at 7:32 PM     Finalized by (CST): Shruti Byrnes MD on 8/13/2024 at 7:42 PM      Lab Review     Recent Labs   Lab 08/12/24  1604 08/13/24  0439 08/14/24  0431    151* 146*   K 4.1 4.0 3.6    119* 114*   CO2 25.0 22.0 27.0   * 207* 132*   BUN 23 15 28*   CREATSERUM 1.61* 1.17 1.05     Recent Labs   Lab 08/12/24  1604 08/13/24  0439 08/14/24  0430   WBC 15.9* 16.1* 13.2*   HGB 15.9 14.2 11.9*   .0 199.0 168.0     Assesment/Plan:     Neuro:  SAH- differential includes spontaneous vs iatrogenic on antiplt vs traumatic vs aneurysmal.   Family denies any recent head trauma.   CTA/MRA neg for vasc malformation, family declines angio for further eval.   Cont keppra for seizure prophylaxis x7d total.  H/o stroke w/residual R HP- hold antiplt 2/2 above until cleared per Neurosurg.   CTA with diffuse intracranial athero.   No further neurocritical care needs at this time, further management and outpt follow-up per Neurosurg, will follow peripherally please call with any questions.   Cardiac:  Htn/hl- sbp goal 100-140, cont statin and cozaar  Pulmonary:  Stable on RA  Renal:  IVFs, monitor BUN/Cr  GI:  PO as tolerated  Heme/ID:  Afebrile, trend leukocytosis  Endocrine/Rheum:  Monitor glucose, sliding scale insulin prn  DVT Prophylaxis:  SCD’s    Goals of the Day: neurochecks   A total of 40 minutes of critical care time (exclusive of billable procedures) was administered to manage and/or  prevent neurologic instability. This involved direct patient intervention, complex decision making, and/or extensive discussions with the patient, family, and clinical staff.    Thank you for allowing me to participate in the care of this patient.     Hao Mosqueda MD, [facility]  Medical Director of System Neurosciences  Chief, Section of Neurocritical Care  [facility]

## 2024-08-14 NOTE — CM/SW NOTE
Patient evaluated by Dr Richter--appropriate for ACUTE--with ELOS 2 weeks.  Message left for daughter Aldo (phone ) to update her of outcome--confirm facility choice--await call back    Daughter called back--confirmed choice for rehab--[facility]--reserved in [facility]--requested insurance auth be started

## 2024-08-14 NOTE — CM/SW NOTE
Met with daughter @ bedside to discuss discharge planning as patient asleep during conversation.  Explained how patient worked with therapies yesterday, INTENSE recommendation.  For purpose of clarity, defined the two types of rehab--ACUTE (rabbit) and ALANA (turtle).  Patient to be evaluated by physiatrist to determine if patient has stamina to participate with therapies for 3-hours daily.    When this 'race' begins, the track or path is the same, it is the speed @ which they get to the finish line that differentiates the two.  Daughter in agreement with this.    Prior to admission, patient residing with spouse of 49 years in an apartment with an elevator.  Patient fairly independent per daughter.    Lastly, explained once evaluated for acute rehab and if deemed clinically appropriate for this level of care, would proceed with facility choice (daughter leaning towards ) for insurance auth.  CM/SW to continue to follow

## 2024-08-14 NOTE — CONSULTS
.[facility]    Raymond Smith Patient Status:  Inpatient    3/28/1945 MRN [id number]   Location [facility] 6NE-A Attending Stevan Pizarro DO   Hosp Day # 2 PCP Lisa Wetzel MD     Patient Identification  Raymond Smith is a 79 year old male.  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    CT Brain 24 CONCLUSION:     1. There is mild to moderate scattered subarachnoid hemorrhage most pronounced along the anterior inferior right frontal sulci, left parietal sulci, and minimally along the right sylvian fissure.  CTA of the head is suggested for further evaluation.    2. Thin subdural hematoma along the anterior right frontal lobe measuring up to 2 mm in thickness.    3. Stable mild chronic microvascular ischemic changes in the cerebral white matter.  If there is clinical concern for acute ischemia/infarction, an MRI of the brain would be recommended for further evaluation.     MRI Brain 24 CONCLUSION:  Punctate focus of restricted diffusion in the left frontal lobe cortex may represent a small focus of acute infarction.    There is curvy linear increased signal in the left parietal lobe and right frontal lobe corresponding to areas of acute subarachnoid hemorrhage.    2 mm  subdural hematoma overlying the right frontal lobe.    No significant mass effect or midline shift.     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    There is mild-to-moderate narrowing of M1 and M2 branches of the middle cerebral arteries and V4 segment of the left vertebral artery.     2D Echo Conclusions:   1. Left ventricle: The cavity size was normal. Wall thickness was mildly      increased. Systolic function was mildly reduced. The estimated ejection      fraction was 50-55%, by visual assessment. Hypokinesis of the basal      inferior wall.   2. Left atrium: The left atrial volume was normal.   3. Aortic root: The aortic root was mildly dilated. The aortic root was      3.8cm diameter.   4. Ascending aorta: The ascending aorta was 4.0cm diameter. The ascending      aorta was mildly dilated.     Seen by Neurology and Neurosurgery.  Conservative management with antiplatelets on hold.  No angiogram planned.  No seizures/emesis      Seen by SLP.  On MS/thins      Physiatry consult obtained now to assess pt's funtional status and make appropriate recommendations.      HOME SITUATION  Type of Home: Apartment   Home Layout: Elevator     Lives With: Spouse  Drives: No  Patient Owned Equipment: Rolling walker  Patient Regularly Uses: Glasses     Prior Level of [place]:  Pt lives with his wife in an apt. Pt was able to perform majority of his ADLs. Pt did not use an assisted device to ambulate. Pt has a hx of CVA, Pt at baseline has a hx of R facial droop and also has a tendency to lean towards the R .     Current Functional Status:     Bed Mobility:  Rolling: Min A  Supine to sit: Mod A. Pt required Mod A to bring the UE trunk to sitting position        Sit to supine: NT                  Transfer Mobility:  Sit to stand: Mod A            Stand to sit: Min A  Gait = NT     Therapist's Comments: While standing pt required min A while using the  RW to maintain standing. When attempting to performing transfer from bed to chair using the RW, pt required constant tactile and verbal cues to safely complete transfer as pt was observed with poor safety awareness. Pt required to be tactile guided using the RW with with mod A to complete transfer. At this time pt is to unsteady to attempt ambulation.     Past Medical History:  @Medical hx@  Past Medical History:    CVA (cerebral vascular accident) (HCC)    Diabetes (HCC)    Essential hypertension    High blood pressure    High cholesterol       Past Surgical History:   Procedure Laterality Date    Other surgical history      feeding tube        [COMPLETED] potassium chloride 40 mEq in 250mL sodium chloride 0.9% IVPB premix  40 mEq Intravenous Once    levETIRAcetam (Keppra) tab 500 mg  500 mg Oral BID    losartan (Cozaar) tab 50 mg  50 mg Oral Daily    atorvastatin (Lipitor) tab 20 mg  20 mg Oral Nightly    donepezil (Aricept) tab 10 mg  10 mg Oral Nightly    labetalol (Trandate) 5 mg/mL injection 10 mg  10 mg Intravenous Q10 Min PRN    hydrALAzine (Apresoline) 20 mg/mL injection 10 mg  10 mg Intravenous Q2H PRN    acetaminophen (Tylenol) tab 650 mg  650 mg Oral Q4H PRN    Or    acetaminophen (Tylenol) rectal suppository 650 mg  650 mg Rectal Q4H PRN    ondansetron (Zofran) 4 MG/2ML injection 4 mg  4 mg Intravenous Q6H PRN    Or    metoclopramide (Reglan) 5 mg/mL injection 5 mg  5 mg Intravenous Q8H PRN    midazolam (Versed) 2 MG/2ML injection 2 mg  2 mg Intravenous Q15 Min PRN    [COMPLETED] gadoterate meglumine (Dotarem) 7.5 MMOL/15ML injection 15 mL  15 mL Intravenous ONCE PRN    [COMPLETED] sodium chloride 0.9 % IV bolus 1,000 mL  1,000 mL Intravenous Once    [COMPLETED] sodium chloride 0.9 % IV bolus 2,259 mL  30 mL/kg Intravenous Once    [COMPLETED] piperacillin-tazobactam (Zosyn) 4.5 g in dextrose 5% 100 mL IVPB-ADDV  4.5 g Intravenous Once    [COMPLETED] iopamidol 76% (ISOVUE-370) injection for power  injector  80 mL Intravenous ONCE PRN    [COMPLETED] iopamidol 76% (ISOVUE-370) injection for power injector  75 mL Intravenous ONCE PRN    [COMPLETED] labetalol (Trandate) 5 mg/mL injection 10 mg  10 mg Intravenous Once    morphINE PF 2 MG/ML injection 1 mg  1 mg Intravenous Q2H PRN    Or    morphINE PF 2 MG/ML injection 2 mg  2 mg Intravenous Q2H PRN    Or    morphINE PF 4 MG/ML injection 4 mg  4 mg Intravenous Q2H PRN    glucose (Dex4) 15 GM/59ML oral liquid 15 g  15 g Oral Q15 Min PRN    Or    glucose (Glutose) 40% oral gel 15 g  15 g Oral Q15 Min PRN    Or    glucose-vitamin C (Dex-4) chewable tab 4 tablet  4 tablet Oral Q15 Min PRN    Or    dextrose 50% injection 50 mL  50 mL Intravenous Q15 Min PRN    Or    glucose (Dex4) 15 GM/59ML oral liquid 30 g  30 g Oral Q15 Min PRN    Or    glucose (Glutose) 40% oral gel 30 g  30 g Oral Q15 Min PRN    Or    glucose-vitamin C (Dex-4) chewable tab 8 tablet  8 tablet Oral Q15 Min PRN    insulin aspart (NovoLOG) 100 Units/mL FlexPen 1-5 Units  1-5 Units Subcutaneous TID AC and HS       Social History     Tobacco Use    Smoking status: Never    Smokeless tobacco: Never   Substance Use Topics    Alcohol use: Never       History reviewed. No pertinent family history.    Allergies:  No Known Allergies        Lab Results   Component Value Date    WBC 13.2 08/14/2024    HGB 11.9 08/14/2024    HCT 37.3 08/14/2024    .0 08/14/2024    CREATSERUM 1.05 08/14/2024    BUN 28 08/14/2024     08/14/2024    K 3.6 08/14/2024     08/14/2024    CO2 27.0 08/14/2024     08/14/2024    CA 9.5 08/14/2024    PGLU 191 08/14/2024       Review of Systems:  Complete review of systems completed/14 point.  Negative except as that outlined in HPI    OBJECTIVE:    Blood pressure 136/78, pulse 57, temperature 98.1 °F (36.7 °C), temperature source Temporal, resp. rate 16, weight 131 lb 13.4 oz (59.8 kg), SpO2 96%.    Intake/Output Summary (Last 24 hours) at 8/14/2024 1500  Last data  filed at 8/14/2024 0800  Gross per 24 hour   Intake 350 ml   Output 900 ml   Net -550 ml       Physical Exam:                                      General: Alert, cooperative, no distress, appears stated age.  Head:  Normocephalic, without obvious abnormality, atraumatic.   Eyes:  Conjunctivae/lids clear. PERRL, EOMs intact. Vision functional.   Ears/Nose/Throat: Hearing intact. Lips, mucosa, and tongue normal. Teeth and gums normal. Moist mucous membranes.     Neck: No neck masses or thyroid enlargement/tenderness/nodules.       Lungs:   Resonant, clear breath sounds, quiet accessory muscles.   Chest wall:  No tenderness or deformity.   Cardiovascular:  Heart with regular rate rhythm, no murmurs appreciated. Radial and pedal pulses good. No cyanosis in all extremities.   Abdomen:   No tenderness guarding or rigidity. Liver and spleen are not enlarged.  Bowel sounds present.                   Musculoskeletal:     Right Upper Extremity:  Strength is 4.  ROM WNL.   Left Upper Extremity:  Strength is 4.  ROM WNL.   Right Lower Extremity:  Strength  is 4.   ROM WNL.   Left Lower Extremity: Strength  is 4.   ROM WNL.          Neuro: CNII-XII are grossly intact other than CNVII.                   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     Medical necessity includes  thromboembolic risk, fall risk, seizure risk, medication management, bleeding risk, blood glucose management, prevention of pressure ulcer, prevention of aspiration pneumonia.     Patient would benefit and is able to participate in 3 hours of therapy daily. Patient is anticipated to discharge to home when  acute inpatient rehabilitation course is complete.          Thank you for the consult.     Edilma Richter MD  [facility].

## 2024-08-14 NOTE — PLAN OF CARE
Assumed care of Mr. Smith at 1930; recently arrived from MRI. He is alert/oriented x 4. R droop & slurring remain the same.  EOMS intact; PERRLA, brisk.Following commands x 4; extremities all with equal strength.. Pls see neuro flow sheet for full neuro exam. NSR; SBP maintaining <150. Reinforced aspiration precaution with HOB  @ 45 degrees while eating & no straw when drinking  2000- MRI/MRA Brain results called to SHREYA Najera. No new orders received.  2200- Drowsy and following commands, answering orientation questions with eyes closed. SHREYA Najera updated. Nimotop given as scheduled.  0630- No acute event over night. At one time- wife at bedside observed about to give more water to patient using straw. Reinforced no straw with thin liquids. Plan for daily TCD.

## 2024-08-14 NOTE — PHYSICAL THERAPY NOTE
PHYSICAL THERAPY TREATMENT NOTE - INPATIENT    Room Number: 6612/6612-A     Session: 1     Number of Visits to Meet Established Goals: 3    Presenting Problem: Subarachnoid Hemorrhage  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, per son in law, HTN, DM    PHYSICAL THERAPY MEDICAL/SOCIAL HISTORY  History related to current admission: Patient is a 79 year old male admitted on 8/12/2024 from Home for Subarachnoid Hemorrhage.       HOME SITUATION  Type of Home: Apartment   Home Layout: Elevator     Lives With: Spouse  Drives: No  Patient Owned Equipment: Rolling walker  Patient Regularly Uses: Glasses     Prior Level of [place]: Pt's Son in law was present in the session. Pt lives with his wife in an apt. Pt was able to perform majority of his ADLs. Pt did not use an assisted device to ambulate. Pt has a hx of CVA, Pt at baseline has a hx of R facial droop and also has a tendency to lean towards the R .     ASSESSMENT   Patient demonstrates limited progress this session, goals  remain in progress.    Patient continues to function at baseline with bed mobility, transfers, and gait.  Contributing factors to remaining limitations include impaired sitting/standing balance, impaired coordination, impaired motor planning, and decreased muscular endurance.  Next session anticipate patient to progress bed mobility and transfers.  Physical Therapy will continue to follow patient for duration of hospitalization.    Patient continues to benefit from continued skilled PT services: to facilitate return to prior level of function as patient demonstrates high motivation with excellent tolerance to an intensive therapy program .    PLAN  PT Treatment Plan: Bed mobility;Body mechanics;Endurance;Gait training;Strengthening;Stair training;Transfer training;Balance training  Rehab Potential : Good  Frequency (Obs): 3-5x/week    CURRENT GOALS   Goal #1 Patient is able to demonstrate supine - sit EOB @ level: minimum  assistance      Goal #2 Patient is able to demonstrate transfers EOB to/from [facility] at assistance level: minimum assistance      Goal #3 Patient is able to ambulate 50 feet with assist device: walker - rolling at assistance level: minimum assistance      Goal #4     Goal #5     Goal #6     Goal Comments: Goals established on 8/13/2024 8/14/2024 all goals ongoing     SUBJECTIVE  \"NO\"    OBJECTIVE  Precautions: Bed/chair alarm;Seizure (-150)    WEIGHT BEARING RESTRICTION  Weight Bearing Restriction: None                PAIN ASSESSMENT   Rating: Unable to rate  Location: Pt reports no pain       BALANCE                                                                                                                       Static Sitting: Fair -  Dynamic Sitting: Poor +           Static Standing: Poor +  Dynamic Standing: Poor +    ACTIVITY TOLERANCE                         O2 WALK         AM-PAC '6-Clicks' INPATIENT SHORT FORM - BASIC MOBILITY  How much difficulty does the patient currently have...  Patient Difficulty: Turning over in bed (including adjusting bedclothes, sheets and blankets)?: A Lot   Patient Difficulty: Sitting down on and standing up from a chair with arms (e.g., wheelchair, bedside commode, etc.): A Lot   Patient Difficulty: Moving from lying on back to sitting on the side of the bed?: A Lot   How much help from another person does the patient currently need...   Help from Another: Moving to and from a bed to a chair (including a wheelchair)?: A Lot   Help from Another: Need to walk in hospital room?: A Lot   Help from Another: Climbing 3-5 steps with a railing?: A Lot       AM-PAC Score:  Raw Score: 12   Approx Degree of Impairment: 68.66%   Standardized Score (AM-PAC Scale): 35.33   CMS Modifier (G-Code): CL    FUNCTIONAL ABILITY STATUS  Gait Assessment   Functional Mobility/Gait Assessment  Gait Assistance: Not tested  Distance (ft): 1 (spt)    Skilled Therapy Provided    Bed  Mobility:  Rolling: Max A   Supine<>Sit: Max A    Sit<>Supine: Max A     Transfer Mobility:  Sit<>Stand: Max A   Stand<>Sit: Max A   Gait: NT    Therapist's Comments: Pt impulsive and upset - shoved walker away, and started to stand impulsively.       Patient End of Session: Up in chair;Needs met;All patient questions and concerns addressed;Call light within reach;RN aware of session/findings    PT Session Time: 8 minutes  Gait Trainin minutes  Therapeutic Activity: 0 minutes  Therapeutic Exercise: 0 minutes   Neuromuscular Re-education: 8 minutes

## 2024-08-14 NOTE — SLP NOTE
SPEECH DAILY NOTE - INPATIENT    ASSESSMENT & PLAN   ASSESSMENT  Pt seen for dysphagia tx to assess tolerance with recommended diet, ensure proper utilization of aspiration precautions and provide pt/family education.  Patient alert and up in bedside chair eating breakfast. He is able to feed himself using his left hand. He tends to take large bites of oatmeal but eats at a slow pace and tolerates well. He was able to self present thin liquids by cup and straw and self limited independently to one drink at a time with good overt tolerance. With bite size solids be was observed to take multiple pieces (excessive amount) and required verbal cueing from daughter (who arrived shortly after me) to slow rate of intake. She reports this is his typical eating routine. This behavior with solids does place him at increased aspiration risk. Encouraged continued reminders for reduced rate of intake.     Discussed with daughter further regarding his home eating routine. She notes that the patient's wife prepares food so that it is typically soft and cut up. She notes he will eat meat but it tends to be ground meat in meatballs. Discussed a variety of diet consistency options and agreeable to adjust recommendation to soft and bite size solids. Discussed trialing straw use but resuming no straw restriction should patient exhibit increased signs of aspiration with straw use. Patient daughter in agreement. Updated RN.     Diet Recommendations - Solids: Mechanical soft chopped/ Soft & Bite Sized  Diet Recommendations - Liquids: Thin Liquids    Compensatory Strategies Recommended: Slow rate;Small bites and sips  Aspiration Precautions: Upright position;Slow rate;Small bites and sips  Medication Administration Recommendations: One pill at a time    Patient Experiencing Pain: No                Treatment Plan  Treatment Plan/Recommendations: Aspiration precautions    Interdisciplinary Communication: Discussed with RN             GOALS  Goal #1 The patient will tolerate soft and bite size consistency and thin liquids without overt signs or symptoms of aspiration with 95 % accuracy over 1-2 session(s).  In Progress -updated 8/14   Goal #2 The patient/family/caregiver will demonstrate understanding and implementation of aspiration precautions and swallow strategies independently over 1-2 session(s).     In Progress     FOLLOW UP  Follow Up Needed (Documentation Required): Yes  SLP Follow-up Date: 08/15/24  Number of Visits to Meet Established Goals: 2    Session: 1    If you have any questions, please contact John Stone MS CCC-SLP  Pager [phone]

## 2024-08-14 NOTE — OCCUPATIONAL THERAPY NOTE
OCCUPATIONAL THERAPY TREATMENT NOTE - INPATIENT     Room Number: 6612/6612-A  Session: 1   Number of Visits to Meet Established Goals: 5    Presenting Problem: Diarrhea, SAH, R frontal SDH      ASSESSMENT   Patient demonstrates limited progress this session, goals remain in progress. Pt was less receptive to working with therapy today.     Patient continues to function below baseline with  all ADL .   Contributing factors to remaining limitations include: decreased functional strength, decreased endurance, impaired sitting and standing balance, impaired coordination, and impaired motor planning. Occupational Therapy will continue to follow for duration of hospitalization.     Patient continues to benefit from continued skilled OT services: to facilitate return to prior level of function as patient demonstrates high motivation with excellent tolerance to an intensive therapy program.        OT Device Recommendations: TBD    History: Patient is a 79 year old male admitted on 8/12/2024 with Presenting Problem: Diarrhea, SAH, R frontal SDH. 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, per son in law, HTN, DM     WEIGHT BEARING RESTRICTION  Weight Bearing Restriction: None     TREATMENT SESSION:   Patient Start of Session: supine  FUNCTIONAL TRANSFER ASSESSMENT  Sit to Stand: Edge of Bed  Edge of Bed: Not Tested    BED MOBILITY  Supine to Sit : Moderate Assist  Sit to Supine (OT): Maximum Assist      EDUCATION PROVIDED  Patient: Role of Occupational Therapy; Plan of Care; Functional Transfer Techniques  Patient's Response to Education: Demonstrates Disinterest    Therapy comments: Pt was seen with PT.  Pt appeared to be more attentive to his surroundings today. Pt was able to hold and hand over a bottle of water and utensil from his bed to PT's hand.   As pt was trying to move his legs off of the bed, pt paused, pointed at bed, and commented, \"I want to be here!\"  Pt agreed to walk to the chair.  After  sitting at edge of bed, RW was placed close to the pt. He pushed the RW away quickly.  Patient started to pivot transfer to the chair. Max A x 1.  While seated in the chair, pt had a sip of water from a water bottle. Coughing. Notified RN.   Back to bed, max A x 1.   Bed alarm on.        SUBJECTIVE  \"I want to be here!\" Pointing to bed.    PAIN ASSESSMENT  Ratin           OBJECTIVE  Precautions: Bed/chair alarm;Seizure (-150)    AM-PAC ‘6-Clicks’ Inpatient Daily Activity Short Form  -   Putting on and taking off regular lower body clothing?: A Lot  -   Bathing (including washing, rinsing, drying)?: A Lot  -   Toileting, which includes using toilet, bedpan or urinal? : A Lot  -   Putting on and taking off regular upper body clothing?: A Lot  -   Taking care of personal grooming such as brushing teeth?: A Little  -   Eating meals?: A Little    AM-PAC Score:  Score: 14  Approx Degree of Impairment: 59.67%  Standardized Score (AM-PAC Scale): 33.39    PLAN  OT Treatment Plan: Energy conservation/work simplification techniques;Balance activities;ADL training;UE strengthening/ROM;Functional transfer training;Patient/Family education;Equipment eval/education;Neuromuscluar reeducation;Compensatory technique education;Fine motor coordination activities  Rehab Potential : Good  Frequency: 3-5x/week    OT Goals:   Ongoing   ADL Goals   Patient will perform grooming: with setup  Patient will perform upper body dressing:  with setup  Patient will perform lower body dressing:  with min assist  Patient will perform toileting: with min assist     Functional Transfer Goals  Patient will transfer from supine to sit:  with supervision  Patient will transfer to toilet:  with supervision     UE Exercise Program Goal  Patient will be supervision with bilateral AROM HEP (home exercise program).     Additional Goals  Pt will sit at edge of bed for 2 minutes, sba in order to prepare for seated ADL tasks.     Therapist  Goals  PHQ 9    OT Session Time: 13 minutes  Self-Care Home Management:  minutes  Therapeutic Activity: 8 minutes  Neuromuscular Re-education:  minutes  Therapeutic Exercise:  minutes  Cognitive Skills: 3 minutes

## 2024-08-15 LAB
ANION GAP SERPL CALC-SCNC: 7 MMOL/L (ref 0–18)
BUN BLD-MCNC: 21 MG/DL (ref 9–23)
CALCIUM BLD-MCNC: 9.2 MG/DL (ref 8.7–10.4)
CHLORIDE SERPL-SCNC: 110 MMOL/L (ref 98–112)
CO2 SERPL-SCNC: 26 MMOL/L (ref 21–32)
CREAT BLD-MCNC: 0.9 MG/DL
EGFRCR SERPLBLD CKD-EPI 2021: 87 ML/MIN/1.73M2 (ref 60–?)
ERYTHROCYTE [DISTWIDTH] IN BLOOD BY AUTOMATED COUNT: 11.9 %
GLUCOSE BLD-MCNC: 136 MG/DL (ref 70–99)
GLUCOSE BLD-MCNC: 143 MG/DL (ref 70–99)
GLUCOSE BLD-MCNC: 232 MG/DL (ref 70–99)
GLUCOSE BLD-MCNC: 247 MG/DL (ref 70–99)
GLUCOSE BLD-MCNC: 253 MG/DL (ref 70–99)
HCT VFR BLD AUTO: 36.3 %
HGB BLD-MCNC: 12.1 G/DL
MCH RBC QN AUTO: 32.9 PG (ref 26–34)
MCHC RBC AUTO-ENTMCNC: 33.3 G/DL (ref 31–37)
MCV RBC AUTO: 98.6 FL
OSMOLALITY SERPL CALC.SUM OF ELEC: 301 MOSM/KG (ref 275–295)
PLATELET # BLD AUTO: 158 10(3)UL (ref 150–450)
POTASSIUM SERPL-SCNC: 3.9 MMOL/L (ref 3.5–5.1)
POTASSIUM SERPL-SCNC: 3.9 MMOL/L (ref 3.5–5.1)
RBC # BLD AUTO: 3.68 X10(6)UL
SODIUM SERPL-SCNC: 143 MMOL/L (ref 136–145)
WBC # BLD AUTO: 10.2 X10(3) UL (ref 4–11)

## 2024-08-15 RX ORDER — HYDRALAZINE HYDROCHLORIDE 50 MG/1
50 TABLET, FILM COATED ORAL EVERY 6 HOURS PRN
Status: DISCONTINUED | OUTPATIENT
Start: 2024-08-15 | End: 2024-08-16

## 2024-08-15 RX ORDER — HYDRALAZINE HYDROCHLORIDE 25 MG/1
25 TABLET, FILM COATED ORAL ONCE
Status: COMPLETED | OUTPATIENT
Start: 2024-08-15 | End: 2024-08-15

## 2024-08-15 RX ORDER — HYDRALAZINE HYDROCHLORIDE 25 MG/1
25 TABLET, FILM COATED ORAL EVERY 6 HOURS PRN
Status: DISCONTINUED | OUTPATIENT
Start: 2024-08-15 | End: 2024-08-15

## 2024-08-15 NOTE — CM/SW NOTE
Called daughter and met with patient at the bedside to confirm discharge plan for [facility] cheek initiated and pending    / to remain available for support and/or discharge planning.     Stacey Crowder MBA MSN, RN CTL/  [id number]

## 2024-08-15 NOTE — PLAN OF CARE
Assumed care around 2150  A&Ox4, VSS, up with 2max and steri steady   No change in neuro status   Patient and family updated on POC   Report given to night RN   All questions answered

## 2024-08-15 NOTE — PLAN OF CARE
Assumed care at around 1900. Q4 neuro checks. Neuro intact pt following commands, see flowsheets for full neuro exam. Voiding per external cath. SBP maintaining < 150. No complaints of pain. VSS. Wife at bedside and updated on plan of care.

## 2024-08-15 NOTE — PROGRESS NOTES
[facility]   part of [facility] Hospitalist Progress Note     Raymond Smith Patient Status:  Inpatient    3/28/1945 MRN [id number]   Location [facility] 6NE-A Attending Stevan Pizarro, DO   Hosp Day # 3 PCP Lisa Wetzel MD     Follow Up:  The primary encounter diagnosis was Subarachnoid hemorrhage (HCC). Diagnoses of Acute subdural hematoma (HCC), Diarrhea, unspecified type, Hyperglycemia, Lactic acidosis, and Sinus tachycardia were also pertinent to this visit.    Subjective:     Patient seen and examined.  No complaints, denies HA, F/C, N/V.  Awaiting insurance auth for rehab.     Objective:    Review of Systems:   10 point ROS completed and was negative, except for pertinent positive and negatives stated in subjective.    Vital signs:  Temp:  [97.7 °F (36.5 °C)-98.7 °F (37.1 °C)] 98.5 °F (36.9 °C)  Pulse:  [59-79] 77  Resp:  [14-23] 21  BP: (132-162)/(65-75) 162/70  SpO2:  [91 %-99 %] 98 %    Physical Exam:    Gen: +dysarthria.  Aox 3.  R facial droop.  Responds well to commands.     HEENT:  EOMI, PERRLA, OP clear, MMM  Pulm: Lungs clear bilaterally, normal respiratory effort  CV: Tachy, reg, no murmur.  Normal PMI.    Abd: Abdomen soft, nontender, nondistended, no organomegaly, bowel sounds present  MSK: Full range of motion in extremities, no clubbing, no cyanosis  Skin: no rashes or lesions  Neuro:  RUE/RLE 4/5 strength       Diagnostic Data:    Labs:  Recent Labs   Lab 24  1604 24  0439 24  0430 08/15/24  0554   WBC 15.9* 16.1* 13.2* 10.2   HGB 15.9 14.2 11.9* 12.1*   .0* 100.7* 101.1* 98.6   .0 199.0 168.0 158.0   INR 1.01  --   --   --        Recent Labs   Lab 24  1604 24  0439 24  0431 08/15/24  0554   * 207* 132* 143*   BUN 23 15 28* 21   CREATSERUM 1.61* 1.17 1.05 0.90   CA 11.8* 10.4 9.5 9.2   ALB 5.4*  --   --   --     151* 146* 143   K 4.1 4.0 3.6 3.9  3.9    119* 114* 110    CO2 25.0 22.0 27.0 26.0   ALKPHO 60  --   --   --    AST 15  --   --   --    ALT 13  --   --   --    BILT 1.2*  --   --   --    TP 8.8*  --   --   --        CrCl cannot be calculated (Unknown ideal weight.).    Recent Labs   Lab 08/12/24  1604   PTP 13.3   INR 1.01            COVID-19 Lab Results    COVID-19  Lab Results   Component Value Date    COVID19 Not Detected 08/12/2024    COVID19 Not Detected 05/10/2021       Pro-Calcitonin  No results for input(s): \"PCT\" in the last 168 hours.    Cardiac  Recent Labs   Lab 08/12/24  1604   PBNP 3,801*       Creatinine Kinase  No results for input(s): \"CK\" in the last 168 hours.    Inflammatory Markers  No results for input(s): \"CRP\", \"SAMAN\", \"LDH\", \"DDIMER\" in the last 168 hours.    Imaging: Imaging data reviewed in Epic.    Medications:    levETIRAcetam  500 mg Oral BID    losartan  50 mg Oral Daily    atorvastatin  20 mg Oral Nightly    donepezil  10 mg Oral Nightly    insulin aspart  1-5 Units Subcutaneous TID AC and HS       Assessment & Plan:      79 yr old male with PMH sig for CVA, DM II, HTN, and HLD who presented to the ED for evaluation of weakness and diarrhea.     # Subarachnoid hemorrhage   # Subdural hematoma   - unclear etiology   - NSGY and neuroCC c/s appreciated   - neuro checks and BP parameters per neuroCC  - cont keppra for seizure prophy   - PT/OT/speech--> acute rehab   - repeat CT head as outpt     # Lactic acidosis  - s/p IVFs  - resolved     # Essential HTN  - cont losartan   - maintain SBP goal 100-140  -      # HLD  - cont statin     # DM II with hyperglycemia   - hold po DM meds  - ISS with accuchecks      # Hx of CVA  # Residual R sided hemiparesis   - hold plavix given SAH/SDH until outpt f/u  - PT/OT/speech-->PMR-->acute rehab      Plan of care: inpt care.     Plan of care discussed with patient or family at bedside.    Stevan Pizarro DO    Supplementary Documentation:     Quality:  DVT Prophylaxis: SCD  CODE status: FULL  Chavira:  no  Central line: no  If COVID testing is negative, may discontinue isolation: yes     Estimated date of discharge: TBD  Discharge is dependent on: insurance auth  At this point Mr. Smith is expected to be discharge to: Acute rehab     Plan of care discussed with pt

## 2024-08-15 NOTE — PLAN OF CARE
Assumed care at 0730  A&Ox4,VSS, up with 2max and seri steady   No complaints of pain   No change in neuro status   Tolerating diet   (+) BM   Voiding per external cath   SLP eval complete   Patient and family updated on POC   All questions answered   Call light within reach

## 2024-08-15 NOTE — PLAN OF CARE
Stroke booklet given to patient; the following education was provided:     What is stroke  BEFAST - Stroke warning sings and symptoms  How to initiate EMS  Secondary stroke prevention and personalized risk factors: DM, HTN, HLD  Lifestyle modifications (nutrition and exercise)  Post-stroke recovery and follow-up  Community resources (stroke support group and non-emergent stroke line)    Patient present during education, Patient receptive to teachings. All pertinent questions and concerns were addressed.    Per pt's nurse MARIANO Li, okay to provide education, as patient is English speaking. Upon arrival to patients room, pt agreed to have education. Patient had no further questions.       RN Stroke Navigator team  [phone]

## 2024-08-15 NOTE — DISCHARGE PLANNING
Patient follow-up appointment information:     Visit Type: SAH follow up    Date of TIA/Stroke: 8/12/24  Type of Stroke: SAH  Patient to follow-up: 3 months  OP Neurologist: has seen Art Guy in past   New patient to neurology service: No  Anticipated discharge (if known): TBD  Discharge disposition (if known): Acute Rehab    Please contact patient's FRED CRAWFORD (Daughter)  [phone] schedule SAH follow-up appointment.     Thank you  RN Stroke Navigator team  [phone]

## 2024-08-15 NOTE — CM/SW NOTE
[employer] auth started via portal.  [employer] case ID #[id number].  Final insurance authorization is pending.    CM/SW will continue to follow for authorization.    Ciarra Alvarado, DSC      Addendum:   Note:  [facility] clin packet faxed, manually (file too large)    er

## 2024-08-15 NOTE — SLP NOTE
SPEECH DAILY NOTE - INPATIENT    ASSESSMENT & PLAN   ASSESSMENT  Pt seen for dysphagia tx to assess tolerance with recommended diet, ensure proper utilization of aspiration precautions and provide pt/family education.  Patient in bed, awake and interactive. RN notes good tolerance of po meds and good intake with breakfast. Patient able to self present thin liquids by straw independently self limiting to single sips. No overt signs of aspiration. Given tolerance of current diet and discussion with patient daughter prior regarding po diet preparation at home closely mirroring soft and bite size diet, current diet is appropriate. Will sign off.     Diet Recommendations - Solids: Mechanical soft chopped/ Soft & Bite Sized  Diet Recommendations - Liquids: Thin Liquids    Compensatory Strategies Recommended: Slow rate;Small bites and sips  Aspiration Precautions: Upright position;Slow rate;Small bites and sips  Medication Administration Recommendations: One pill at a time    Patient Experiencing Pain: No                Treatment Plan  Treatment Plan/Recommendations: No further inpatient SLP service warranted    Interdisciplinary Communication: Discussed with RN            GOALS  Goal #1 The patient will tolerate soft and bite size consistency and thin liquids without overt signs or symptoms of aspiration with 95 % accuracy over 1-2 session(s).  Met   Goal #2 The patient/family/caregiver will demonstrate understanding and implementation of aspiration precautions and swallow strategies independently over 1-2 session(s).     Met     FOLLOW UP  Follow Up Needed (Documentation Required): No  SLP Follow-up Date: 08/15/24  Number of Visits to Meet Established Goals: 2    Session: 2    If you have any questions, please contact John Stone MS CCC-SLP  Pager [phone]

## 2024-08-15 NOTE — PAYOR COMM NOTE
--------------  CONTINUED STAY REVIEW    Payor: [facility]  Subscriber #:  [id number]  Authorization Number: [id number]    Admit date: 8/12/24  Admit time:  9:07 PM    REVIEW DOCUMENTATION:      8/15 IM     Follow Up:  The primary encounter diagnosis was Subarachnoid hemorrhage (HCC). Diagnoses of Acute subdural hematoma (HCC), Diarrhea, unspecified type, Hyperglycemia, Lactic acidosis, and Sinus tachycardia were also pertinent to this visit.        Subjective:  Patient seen and examined.  No complaints, denies HA, F/C, N/V.  Awaiting insurance auth for rehab.            Objective:  Review of Systems:   10 point ROS completed and was negative, except for pertinent positive and negatives stated in subjective.     Vital signs:  Temp:  [97.7 °F (36.5 °C)-98.7 °F (37.1 °C)] 98.5 °F (36.9 °C)  Pulse:  [59-79] 77  Resp:  [14-23] 21  BP: (132-162)/(65-75) 162/70  SpO2:  [91 %-99 %] 98 %     Physical Exam:    Gen: +dysarthria.  Aox 3.  R facial droop.  Responds well to commands.     HEENT:  EOMI, PERRLA, OP clear, MMM  Pulm: Lungs clear bilaterally, normal respiratory effort  CV: Tachy, reg, no murmur.  Normal PMI.    Abd: Abdomen soft, nontender, nondistended, no organomegaly, bowel sounds present  MSK: Full range of motion in extremities, no clubbing, no cyanosis  Skin: no rashes or lesions  Neuro:  RUE/RLE 4/5 strength         Diagnostic Data:    Labs:         Recent Labs   Lab 08/12/24  1604 08/13/24  0439 08/14/24  0430 08/15/24  0554   WBC 15.9* 16.1* 13.2* 10.2   HGB 15.9 14.2 11.9* 12.1*   .0* 100.7* 101.1* 98.6   .0 199.0 168.0 158.0   INR 1.01  --   --   --                 Recent Labs   Lab 08/12/24  1604 08/13/24  0439 08/14/24  0431 08/15/24  0554   * 207* 132* 143*   BUN 23 15 28* 21   CREATSERUM 1.61* 1.17 1.05 0.90   CA 11.8* 10.4 9.5 9.2   ALB 5.4*  --   --   --     151* 146* 143   K 4.1 4.0 3.6 3.9  3.9    119* 114* 110   CO2 25.0 22.0 27.0  26.0   ALKPHO 60  --   --   --    AST 15  --   --   --    ALT 13  --   --   --    BILT 1.2*  --   --   --    TP 8.8*  --   --   --          CrCl cannot be calculated (Unknown ideal weight.).         Recent Labs   Lab 08/12/24  1604   PTP 13.3   INR 1.01            COVID-19 Lab Results     COVID-19        Lab Results   Component Value Date     COVID19 Not Detected 08/12/2024     COVID19 Not Detected 05/10/2021         Pro-Calcitonin  No results for input(s): \"PCT\" in the last 168 hours.     Cardiac      Recent Labs   Lab 08/12/24  1604   PBNP 3,801*         Creatinine Kinase  No results for input(s): \"CK\" in the last 168 hours.     Inflammatory Markers  No results for input(s): \"CRP\", \"SAMAN\", \"LDH\", \"DDIMER\" in the last 168 hours.     Imaging: Imaging data reviewed in Epic.     Medications:   Scheduled Medications    levETIRAcetam  500 mg Oral BID    losartan  50 mg Oral Daily    atorvastatin  20 mg Oral Nightly    donepezil  10 mg Oral Nightly    insulin aspart  1-5 Units Subcutaneous TID AC and HS                  Assessment & Plan:  79 yr old male with PMH sig for CVA, DM II, HTN, and HLD who presented to the ED for evaluation of weakness and diarrhea.     # Subarachnoid hemorrhage   # Subdural hematoma   - unclear etiology   - NSGY and neuroCC c/s appreciated   - neuro checks and BP parameters per neuroCC  - cont keppra for seizure prophy   - PT/OT/speech--> acute rehab   - repeat CT head as outpt     # Lactic acidosis  - s/p IVFs  - resolved     # Essential HTN  - cont losartan   - maintain SBP goal 100-140  -       # HLD  - cont statin     # DM II with hyperglycemia   - hold po DM meds  - ISS with accuchecks      # Hx of CVA  # Residual R sided hemiparesis   - hold plavix given SAH/SDH until outpt f/u  - PT/OT/speech-->PMR-->acute rehab      Plan of care: inpt care.      Plan of care discussed with patient or family at bedside.     Stevan Pizarro, DO                 MEDICATIONS ADMINISTERED IN LAST 1  DAY:  atorvastatin (Lipitor) tab 20 mg       Date Action Dose Route User    8/14/2024 2021 Given 20 mg Oral Mala Hudson RN          donepezil (Aricept) tab 10 mg       Date Action Dose Route User    8/14/2024 2021 Given 10 mg Oral Mala Hudson RN          hydrALAZINE (Apresoline) tab 25 mg       Date Action Dose Route User    8/15/2024 1252 Given 25 mg Oral Jen Spann RN          hydrALAZINE (Apresoline) tab 25 mg       Date Action Dose Route User    8/15/2024 1559 Given 25 mg Oral Jen Spann RN          insulin aspart (NovoLOG) 100 Units/mL FlexPen 1-5 Units       Date Action Dose Route User    8/15/2024 1559 Given 3 Units Subcutaneous (Left Upper Arm) Jen Spann RN    8/15/2024 1214 Given 3 Units Subcutaneous (Left Upper Arm) Jen Spann RN    8/14/2024 2105 Given 4 Units Subcutaneous (Right Lower Abdomen) Mala Hudson RN    8/14/2024 1754 Given 1 Units Subcutaneous (Right Upper Arm) Samy Isbell RN          levETIRAcetam (Keppra) tab 500 mg       Date Action Dose Route User    8/15/2024 0939 Given 500 mg Oral Jen Spann RN    8/14/2024 2021 Given 500 mg Oral Mala Hudson RN          losartan (Cozaar) tab 50 mg       Date Action Dose Route User    8/15/2024 0939 Given 50 mg Oral Jen Spann RN            Vitals (last day)       Date/Time Temp Pulse Resp BP SpO2 Weight O2 Device O2 Flow Rate (L/min) [facility]    08/15/24 1526 -- 72 23 -- -- -- -- -- AS    08/15/24 1200 98.5 °F (36.9 °C) 77 21 162/70 -- -- None (Room air) -- AS    08/15/24 0800 98.3 °F (36.8 °C) 73 17 153/75 -- -- None (Room air) -- AS    08/15/24 0400 98.1 °F (36.7 °C) 59 14 138/65 98 % -- None (Room air) -- JL    08/15/24 0000 97.7 °F (36.5 °C) 66 18 137/70 91 % -- None (Room air) --     08/14/24 2151 97.7 °F (36.5 °C) 60 17 149/65 95 % -- None (Room air) --     08/14/24 2000 98.7 °F (37.1 °C) 72 23 150/75 98 % -- None (Room air) -- VG    08/14/24 1811 -- 64 18 -- 97 % -- -- -- FS    08/14/24  1600 98.4 °F (36.9 °C) 79 21 137/72 97 % -- None (Room air) -- PS    08/14/24 1500 -- 63 17 132/67 99 % -- -- -- PS    08/14/24 1400 -- 67 16 130/70 98 % -- -- -- PS    08/14/24 1300 -- 66 19 138/62 94 % -- -- -- PS    08/14/24 1200 98.1 °F (36.7 °C) 63 11 152/67 95 % -- None (Room air) -- PS    08/14/24 1100 -- 57 16 136/78 96 % -- -- -- PS    08/14/24 1000 -- 62 14 120/53 94 % -- -- -- PS    08/14/24 0900 -- 64 13 144/64 96 % -- -- -- PS    08/14/24 0800 97.7 °F (36.5 °C) 69 19 131/73 97 % -- None (Room air) -- PS    08/14/24 0700 -- 68 16 136/71 94 % -- -- -- PS    08/14/24 0600 -- 54 17 117/60 94 % -- -- -- RP    08/14/24 0500 98.8 °F (37.1 °C) 71 16 135/64 94 % -- None (Room air) -- RP    08/14/24 0400 -- 59 16 123/61 96 % -- -- -- RP    08/14/24 0300 -- 56 16 125/57 94 % -- -- -- RP    08/14/24 0200 -- 64 16 113/61 94 % -- -- -- RP    08/14/24 0100 -- 63 19 124/57 94 % -- -- -- RP    08/14/24 0000 97.2 °F (36.2 °C) 63 19 111/60 95 % -- None (Room air) -- RP          CIWA Scores (since admission)       None

## 2024-08-15 NOTE — PLAN OF CARE
Assumed pt care @ 2300  Pt A/Ox4, NSR on tele, & on RA   Neuro checks Q4H; see flowsheets  Pt denies pain   Comfort & safety precautions maintained  Pt & family updated on plan of care

## 2024-08-16 VITALS
OXYGEN SATURATION: 95 % | DIASTOLIC BLOOD PRESSURE: 85 MMHG | TEMPERATURE: 99 F | HEIGHT: 69 IN | BODY MASS INDEX: 19.52 KG/M2 | SYSTOLIC BLOOD PRESSURE: 153 MMHG | WEIGHT: 131.81 LBS | HEART RATE: 89 BPM | RESPIRATION RATE: 17 BRPM

## 2024-08-16 LAB
GLUCOSE BLD-MCNC: 177 MG/DL (ref 70–99)
GLUCOSE BLD-MCNC: 252 MG/DL (ref 70–99)

## 2024-08-16 RX ORDER — LOSARTAN POTASSIUM 50 MG/1
50 TABLET ORAL ONCE
Status: COMPLETED | OUTPATIENT
Start: 2024-08-16 | End: 2024-08-16

## 2024-08-16 RX ORDER — LOSARTAN POTASSIUM 100 MG/1
100 TABLET ORAL DAILY
Status: DISCONTINUED | OUTPATIENT
Start: 2024-08-17 | End: 2024-08-16

## 2024-08-16 RX ORDER — LOSARTAN POTASSIUM 50 MG/1
100 TABLET ORAL DAILY
Status: SHIPPED | COMMUNITY
Start: 2024-08-17

## 2024-08-16 RX ORDER — LEVETIRACETAM 500 MG/1
500 TABLET ORAL 2 TIMES DAILY
Status: SHIPPED | COMMUNITY
Start: 2024-08-16

## 2024-08-16 NOTE — PHYSICAL THERAPY NOTE
PHYSICAL THERAPY TREATMENT NOTE - INPATIENT    Room Number: 7622/7622-A     Session: 2    Number of Visits to Meet Established Goals: 3    Presenting Problem: Subarachnoid Hemorrhage  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, per son in law, HTN, DM    PHYSICAL THERAPY MEDICAL/SOCIAL HISTORY  History related to current admission: Patient is a 79 year old male admitted on 8/12/2024 from Home for Subarachnoid Hemorrhage.       HOME SITUATION  Type of Home: Apartment   Home Layout: Elevator     Lives With: Spouse  Drives: No  Patient Owned Equipment: Rolling walker  Patient Regularly Uses: Glasses     Prior Level of [place]: Pt's Son in law was present in the session. Pt lives with his wife in an apt. Pt was able to perform majority of his ADLs. Pt did not use an assisted device to ambulate. Pt has a hx of CVA, Pt at baseline has a hx of R facial droop and also has a tendency to lean towards the R .     ASSESSMENT   Patient demonstrates fair progress this session, goals  remain in progress.    Patient continues to function below baseline with bed mobility, transfers, and gait.  Contributing factors to remaining limitations include decreased endurance/aerobic capacity, pain, and decreased muscular endurance.  Next session anticipate patient to progress bed mobility, transfers, and gait.  Physical Therapy will continue to follow patient for duration of hospitalization.    Patient continues to benefit from continued skilled PT services: to facilitate return to prior level of function as patient demonstrates high motivation with excellent tolerance to an intensive therapy program .    PLAN  PT Treatment Plan: Bed mobility;Body mechanics;Endurance;Gait training;Strengthening;Stair training;Transfer training;Balance training  Rehab Potential : Good  Frequency (Obs): 3-5x/week    CURRENT GOALS     Goal #1 Patient is able to demonstrate supine - sit EOB @ level: minimum assistance      Goal #2  Patient is able to demonstrate transfers [place] to/from [facility] at assistance level: minimum assistance      Goal #3 Patient is able to ambulate 50 feet with assist device: walker - rolling at assistance level: minimum assistance      Goal #4     Goal #5     Goal #6     Goal Comments: Goals established on 2024 all goals ongoing  SUBJECTIVE  Pt did not speak      OBJECTIVE  Precautions: Bed/chair alarm;Seizure (-150)    WEIGHT BEARING RESTRICTION  Weight Bearing Restriction: None                PAIN ASSESSMENT   Ratin  Location: Pt reports no pain       BALANCE                                                                                                                       Static Sitting: Fair -  Dynamic Sitting: Fair -           Static Standing: Poor +  Dynamic Standing: Poor +    ACTIVITY TOLERANCE                         O2 WALK         AM-PAC '6-Clicks' INPATIENT SHORT FORM - BASIC MOBILITY  How much difficulty does the patient currently have...  Patient Difficulty: Turning over in bed (including adjusting bedclothes, sheets and blankets)?: A Little   Patient Difficulty: Sitting down on and standing up from a chair with arms (e.g., wheelchair, bedside commode, etc.): A Lot   Patient Difficulty: Moving from lying on back to sitting on the side of the bed?: A Lot   How much help from another person does the patient currently need...   Help from Another: Moving to and from a bed to a chair (including a wheelchair)?: A Lot   Help from Another: Need to walk in hospital room?: A Lot   Help from Another: Climbing 3-5 steps with a railing?: A Lot       AM-PAC Score:  Raw Score: 13   Approx Degree of Impairment: 64.91%   Standardized Score (AM-PAC Scale): 36.74   CMS Modifier (G-Code): CL    FUNCTIONAL ABILITY STATUS  Gait Assessment   Functional Mobility/Gait Assessment  Gait Assistance: Moderate assistance  Distance (ft): 20  Assistive Device: Rolling walker  Pattern: Shuffle (narrow base of  support)    Skilled Therapy Provided    Bed Mobility:  Rolling: NT   Supine<>Sit: Mod A   Sit<>Supine: NT     Transfer Mobility:  Sit<>Stand: Mod A    Stand<>Sit: Min A   Gait: Mod A 20ft using RW with Wheelchair follow    Therapist's Comments: Pt required constant verbal and tactile cues for proper RW positioning to improve balance while ambulating. Pt had poor carryover to instructions. Attempted to ambulate again, however pt was observed to become more agitated and required to sit down         Patient End of Session: Up in chair;Needs met;Call light within reach;RN aware of session/findings;All patient questions and concerns addressed;Alarm set    PT Session Time: 23 minutes  Gait Trainin minutes  Therapeutic Activity: 15 minutes

## 2024-08-16 NOTE — DISCHARGE SUMMARY
[facility] Internal Medicine Hospitalist Discharge Summary     Patient ID:  Raymond Smith  79 year old  3/28/1945    Admit date: 8/12/2024    Discharge date and time: 8/16/2024    Attending Physician: Stevan Pizarro DO     Primary Care Physician: Lisa Wetzel MD     Discharge Diagnoses:   Subarachnoid hemorrhage   Subdural hematoma   HTN  HLD  DM II  Hx of CVA  Residual R sided hemiparesis     Please note that only IHP DMG and EMG patients enrolled in the Medicare ACO, BCBS ACO and [facility] HMOs will be handled by the [facility] Care Management team.  For all other patients, please follow usual protocol for discharge care transition.    Discharge Condition: stable    Disposition:   acute rehab     Important Follow up:  - PCP: 1 week after discharge   - Consults: neuroCC, NSGY    Follow Up Items:  None    Hospital Course:      79 yr old male with PMH sig for CVA, DM II, HTN, and HLD who presented to the ED for evaluation of weakness and diarrhea.     # Subarachnoid hemorrhage   # Subdural hematoma   - unclear etiology   - NSGY and neuroCC c/s appreciated   - neuro checks and BP parameters per neuroCC  - cont keppra for seizure prophy   - PT/OT/speech--> acute rehab   - repeat CT head as outpt     # Lactic acidosis  - s/p IVFs  - resolved     # Essential HTN  - increase losartan to 100 mg po daily  - maintain SBP goal 100-140  -   # HLD  - cont statin     # DM II with hyperglycemia   - hold po DM meds  - ISS with accuchecks      # Hx of CVA  # Residual R sided hemiparesis   - hold plavix given SAH/SDH until outpt f/u  - PT/OT/speech-->PMR-->acute rehab     Stable for discharge to  acute rehab.      Consults: IP CONSULT TO NEUROSURGERY  IP CONSULT TO NEUROLOGY  IP CONSULT TO INTERVENTIONAL NEUROLOGY  IP CONSULT TO HOSPITALIST  IP CONSULT TO SOCIAL WORK  IP CONSULT TO SOCIAL WORK  IP CONSULT TO SOCIAL WORK  IP CONSULT TO SPIRITUAL CARE  IP CONSULT TO  SOCIAL WORK  IP CONSULT TO PHYSICAL MEDICINE REHAB    Operative Procedures:  None      Patient instructions:      I as the attending physician reconciled the current and discharge medications on day of discharge.        Discharge Medications        START taking these medications        Instructions Prescription details   levETIRAcetam 500 MG Tabs  Commonly known as: Keppra      Take 1 tablet (500 mg total) by mouth 2 (two) times daily.   Refills: 0            CHANGE how you take these medications        Instructions Prescription details   losartan 50 MG Tabs  Commonly known as: Cozaar  Start taking on: August 17, 2024  What changed: how much to take      Take 2 tablets (100 mg total) by mouth daily.   Refills: 0     metFORMIN 500 MG Tabs  Commonly known as: Glucophage  What changed:   how much to take  when to take this  additional instructions      Take 2 tablets (1,000 mg total) by mouth every evening. 2 tabs (Metformin 1000 mg every evening)   Refills: 0            CONTINUE taking these medications        Instructions Prescription details   atorvastatin 20 MG Tabs  Commonly known as: Lipitor      Take 1 tablet (20 mg total) by mouth daily.   Quantity: 90 tablet  Refills: 3     donepezil 10 MG Tabs  Commonly known as: Aricept      Take 1 tablet (10 mg total) by mouth nightly.   Refills: 0     OneTouch Delica Plus [id number] Misc      1 strip by In Vitro route 3 (three) times daily.   Refills: 0     OneTouch Ultra 2 w/Device Kit      1 lancet by Finger stick route 3 (three) times daily.   Refills: 0     OneTouch Ultra Strp  Generic drug: Glucose Blood      1 each by Other route 3 (three) times daily.   Quantity: 400 each  Refills: 3            STOP taking these medications      clopidogrel 75 MG Tabs  Commonly known as: Plavix        erythromycin 5 MG/GM Oint  Commonly known as: Romycin                    Activity:  Up with assist  Diet:  soft, chopped, and bite sized   Wound Care: as directed  Code Status: Full  Code      Exam on day of discharge:     Vitals:    08/16/24 0800   BP: 147/83   Pulse: 74   Resp: 19   Temp: 98.1 °F (36.7 °C)       Gen: +dysarthria.  Aox 3.  R facial droop.  Responds well to commands.     HEENT:  EOMI, PERRLA, OP clear, MMM  Pulm: Lungs clear bilaterally, normal respiratory effort  CV: RRR, no murmur.  Normal PMI.    Abd: Abdomen soft, nontender, nondistended, no organomegaly, bowel sounds present  MSK: Full range of motion in extremities, no clubbing, no cyanosis  Skin: no rashes or lesions  Neuro:  RUE/RLE 4/5 strength       Total time coordinating care for discharge: Greater than 30 minutes    Stevan Pizarro DO  [facility] Hospitalist

## 2024-08-16 NOTE — PLAN OF CARE
NURSING DISCHARGE NOTE    Discharged Rehab facility via Ambulance.  Accompanied by Spouse and Support staff  Belongings Taken by patient/family.    Discharge instructions given, family verbalizes understanding. Report called to Carolyn Sharpe at 1300. All belongings with patient.

## 2024-08-16 NOTE — PLAN OF CARE
Assumed care at approx 0730.  Alert and oriented x4. Neuro assessments q4. No new changes, see flowsheets.  On room air, respirations even and unlabored.  NSR on tele. BP within parameters.  Denies pain.  Safety precautions maintained, patient reports needs met, call light within reach.      Problem: NEUROLOGICAL - ADULT  Goal: Achieves stable or improved neurological status  Description: INTERVENTIONS  - Assess for and report changes in neurological status  - Initiate measures to prevent increased intracranial pressure  - Maintain blood pressure and fluid volume within ordered parameters to optimize cerebral perfusion and minimize risk of hemorrhage  - Monitor temperature, glucose, and sodium. Initiate appropriate interventions as ordered  Outcome: Progressing  Goal: Absence of seizures  Description: INTERVENTIONS  - Monitor for seizure activity  - Administer anti-seizure medications as ordered  - Monitor neurological status  Outcome: Progressing  Goal: Remains free of injury related to seizure activity  Description: INTERVENTIONS:  - Maintain airway, patient safety  and administer oxygen as ordered  - Monitor patient for seizure activity, document and report duration and description of seizure to MD/LIP  - If seizure occurs, turn patient to side and suction secretions as needed  - Reorient patient post seizure  - Seizure pads on all 4 side rails  - Instruct patient/family to notify RN of any seizure activity  - Instruct patient/family to call for assistance with activity based on assessment  Outcome: Progressing  Goal: Achieves maximal functionality and self care  Description: INTERVENTIONS  - Monitor swallowing and airway patency with patient fatigue and changes in neurological status  - Encourage and assist patient to increase activity and self care with guidance from PT/OT  - Encourage visually impaired, hearing impaired and aphasic patients to use assistive/communication devices  Outcome: Progressing

## 2024-08-16 NOTE — CM/SW NOTE
08/16/24 1100   Discharge disposition   Expected discharge disposition Rehab Facili   Post Acute Care Provider [facility]   Discharge transportation [facility]     Insurance auth approved, bed available for DC today to [facility] called pt dtr to provide update. Pt wife is currently at bedside, asked if able to transport with pt in ambulance. PCS completed, ambulance scheduled for 3pm- notified of wife transporting with patient.     Patient to admit to room 2277 at [facility], RN to call [phone] for report.     ARGELIA Tobin

## 2024-08-16 NOTE — OCCUPATIONAL THERAPY NOTE
OCCUPATIONAL THERAPY TREATMENT NOTE - INPATIENT     Room Number: 7622/7622-A  Session: 2   Number of Visits to Meet Established Goals: 5    Presenting Problem: Diarrhea, SAH, R frontal SDH      ASSESSMENT   Patient demonstrates fair progress this session, goals remain in progress.    Patient continues to function below baseline with  all ADL. Contributing factors to remaining limitations include: decreased functional strength, decreased endurance, impaired sitting and standing balance, impaired coordination, and impaired motor planning. Occupational Therapy will continue to follow for duration of hospitalization.      Patient continues to benefit from continued skilled OT services: to facilitate return to prior level of function as patient demonstrates high motivation with excellent tolerance to an intensive therapy program.      OT Device Recommendations: TBD     History: Patient is a 79 year old male admitted on 8/12/2024 with Presenting Problem: Diarrhea, SAH, R frontal SDH. 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, per son in law, HTN, DM   WEIGHT BEARING RESTRICTION  Weight Bearing Restriction: None                Recommendations for nursing staff:   Transfers: iris murray  Toileting location: commode    TREATMENT SESSION:  Patient Start of Session: supine  FUNCTIONAL TRANSFER ASSESSMENT  Sit to Stand: Chair  Edge of Bed: Moderate Assist  Chair: Moderate Assist    BED MOBILITY  Supine to Sit : Moderate Assist  Sit to Supine (OT): Not Tested       EDUCATION PROVIDED  Patient: Role of Occupational Therapy; Plan of Care; Functional Transfer Techniques; Fall Prevention; Posture/Positioning  Patient's Response to Education: Requires Further Education      Equipment used: rw    Therapist comments: pt's wife was present.  Mod A supine to sit.  Min encouragement provided to participate in therapy.  Max A to pull socks. Min cueing and hand-over-hand guidance provided for hand/rw/foot placement before  standing. Mod A. This transfer was completed in preparation for bedside commode transfer. Mod A x 1-2 with RW and close chair follow for functional ambulation in hallway. Pt appeared to be less receptive to education at times. Alarm on. Updated RN.       Patient End of Session: Up in chair;Needs met;Call light within reach;RN aware of session/findings;All patient questions and concerns addressed;Alarm set;Family present        PAIN ASSESSMENT  Ratin           OBJECTIVE  Precautions: Bed/chair alarm;Seizure (-150)    AM-PAC ‘6-Clicks’ Inpatient Daily Activity Short Form  -   Putting on and taking off regular lower body clothing?: A Lot  -   Bathing (including washing, rinsing, drying)?: A Lot  -   Toileting, which includes using toilet, bedpan or urinal? : A Lot  -   Putting on and taking off regular upper body clothing?: A Little  -   Taking care of personal grooming such as brushing teeth?: A Little  -   Eating meals?: A Little    AM-PAC Score:  Score: 15  Approx Degree of Impairment: 56.46%  Standardized Score (AM-PAC Scale): 34.69    PLAN  OT Treatment Plan: Energy conservation/work simplification techniques;Balance activities;ADL training;UE strengthening/ROM;Functional transfer training;Patient/Family education;Equipment eval/education;Neuromuscluar reeducation;Compensatory technique education;Fine motor coordination activities  Rehab Potential : Good  Frequency: 3-5x/week    OT Goals:   Ongoing   ADL Goals   Patient will perform grooming: with setup  Patient will perform upper body dressing:  with setup  Patient will perform lower body dressing:  with min assist  Patient will perform toileting: with min assist     Functional Transfer Goals  Patient will transfer from supine to sit:  with supervision  Patient will transfer to toilet:  with supervision     UE Exercise Program Goal  Patient will be supervision with bilateral AROM HEP (home exercise program).     Additional Goals  Pt will sit at  edge of bed for 2 minutes, sba in order to prepare for seated ADL tasks.     Therapist Goals  PHQ 9    OT Session Time: 23 minutes  Self-Care Home Management:  minutes  Therapeutic Activity: 10 minutes  Neuromuscular Re-education: 8 minutes  Therapeutic Exercise:  minutes  Cognitive Skills: 4 minutes

## 2024-08-16 NOTE — PLAN OF CARE
Assumed care at approximately 2300.  A & O x 4, mainly Romansh speaking but family at the bedside to help translate.  RA.   Neuros q 4, no new deficits. See flowsheets.   PRN PO hydralazine given for increased BP.   External cath in place.   Call light within reach.  Patient and family updated on plan of care.

## 2024-08-16 NOTE — CM/SW NOTE
Department  notified care team of [employer] approval, see below.    Assigned SW/CM to follow up with patient/family on discharge plan.       8/16 -- Mirta ID [id number], approved [id number] 8/15 - 8/19.    Ciarra Alvarado, DSC

## 2024-08-17 NOTE — PAYOR COMM NOTE
--------------  DISCHARGE REVIEW    Payor: [facility]  Subscriber #:  [id number]  Authorization Number: [id number]    Admit date: 8/12/24  Admit time:   9:07 PM  Discharge Date: 8/16/2024  3:38 PM     Admitting Physician: Stevan Pizarro DO  Attending Physician:  No att. providers found  Primary Care Physician: Lisa Wetzel MD          Discharge Summary Notes        Discharge Summary signed by Stevan Pizarro DO at 8/16/2024 12:30 PM       Author: Stevan Pizarro DO Specialty: Internal Medicine Author Type: Physician    Filed: 8/16/2024 12:30 PM Date of Service: 8/16/2024 12:14 PM Status: Signed    : Stevan Pizarro DO (Physician)                                                          [facility] Internal Medicine Hospitalist Discharge Summary     Patient ID:  Raymond Smith  79 year old  3/28/1945    Admit date: 8/12/2024    Discharge date and time: 8/16/2024    Attending Physician: Stevan Pizarro DO     Primary Care Physician: Lisa Wetzel MD     Discharge Diagnoses:   Subarachnoid hemorrhage   Subdural hematoma   HTN  HLD  DM II  Hx of CVA  Residual R sided hemiparesis     Please note that only IHP DMG and EMG patients enrolled in the Medicare ACO, [facility] ACO and [facility] HMOs will be handled by the [facility] Care Management team.  For all other patients, please follow usual protocol for discharge care transition.    Discharge Condition: stable    Disposition:  [facility] acute rehab     Important Follow up:  - PCP: 1 week after discharge   - Consults: neuroCC, NSGY    Follow Up Items:  None    Hospital Course:      79 yr old male with PMH sig for CVA, DM II, HTN, and HLD who presented to the ED for evaluation of weakness and diarrhea.     # Subarachnoid hemorrhage   # Subdural hematoma   - unclear etiology   - NSGY and neuroCC c/s appreciated   - neuro checks and BP parameters per neuroCC  - cont keppra for seizure prophy   - PT/OT/speech-->  acute rehab   - repeat CT head as outpt     # Lactic acidosis  - s/p IVFs  - resolved     # Essential HTN  - increase losartan to 100 mg po daily  - maintain SBP goal 100-140  -   # HLD  - cont statin     # DM II with hyperglycemia   - hold po DM meds  - ISS with accuchecks      # Hx of CVA  # Residual R sided hemiparesis   - hold plavix given SAH/SDH until outpt f/u  - PT/OT/speech-->PMR-->acute rehab     Stable for discharge to  acute rehab.      Consults: IP CONSULT TO NEUROSURGERY  IP CONSULT TO NEUROLOGY  IP CONSULT TO INTERVENTIONAL NEUROLOGY  IP CONSULT TO HOSPITALIST  IP CONSULT TO SOCIAL WORK  IP CONSULT TO SOCIAL WORK  IP CONSULT TO SOCIAL WORK  IP CONSULT TO SPIRITUAL CARE  IP CONSULT TO SOCIAL WORK  IP CONSULT TO PHYSICAL MEDICINE REHAB    Operative Procedures:  None      Patient instructions:      I as the attending physician reconciled the current and discharge medications on day of discharge.        Discharge Medications        START taking these medications        Instructions Prescription details   levETIRAcetam 500 MG Tabs  Commonly known as: Keppra      Take 1 tablet (500 mg total) by mouth 2 (two) times daily.   Refills: 0            CHANGE how you take these medications        Instructions Prescription details   losartan 50 MG Tabs  Commonly known as: Cozaar  Start taking on: August 17, 2024  What changed: how much to take      Take 2 tablets (100 mg total) by mouth daily.   Refills: 0     metFORMIN 500 MG Tabs  Commonly known as: Glucophage  What changed:   how much to take  when to take this  additional instructions      Take 2 tablets (1,000 mg total) by mouth every evening. 2 tabs (Metformin 1000 mg every evening)   Refills: 0            CONTINUE taking these medications        Instructions Prescription details   atorvastatin 20 MG Tabs  Commonly known as: Lipitor      Take 1 tablet (20 mg total) by mouth daily.   Quantity: 90 tablet  Refills: 3     donepezil 10 MG Tabs  Commonly known as:  Aricept      Take 1 tablet (10 mg total) by mouth nightly.   Refills: 0     OneTouch Delica Plus [id number] Misc      1 strip by In Vitro route 3 (three) times daily.   Refills: 0     OneTouch Ultra 2 w/Device Kit      1 lancet by Finger stick route 3 (three) times daily.   Refills: 0     OneTouch Ultra Strp  Generic drug: Glucose Blood      1 each by Other route 3 (three) times daily.   Quantity: 400 each  Refills: 3            STOP taking these medications      clopidogrel 75 MG Tabs  Commonly known as: Plavix        erythromycin 5 MG/GM Oint  Commonly known as: Romycin                    Activity:  Up with assist  Diet:  soft, chopped, and bite sized   Wound Care: as directed  Code Status: Full Code      Exam on day of discharge:     Vitals:    08/16/24 0800   BP: 147/83   Pulse: 74   Resp: 19   Temp: 98.1 °F (36.7 °C)       Gen: +dysarthria.  Aox 3.  R facial droop.  Responds well to commands.     HEENT:  EOMI, PERRLA, OP clear, MMM  Pulm: Lungs clear bilaterally, normal respiratory effort  CV: RRR, no murmur.  Normal PMI.    Abd: Abdomen soft, nontender, nondistended, no organomegaly, bowel sounds present  MSK: Full range of motion in extremities, no clubbing, no cyanosis  Skin: no rashes or lesions  Neuro:  RUE/RLE 4/5 strength       Total time coordinating care for discharge: Greater than 30 minutes    Stevan Pizarro DO  [facility]       Electronically signed by Stevan Pizarro DO on 8/16/2024 12:30 PM         REVIEWER COMMENTS

## 2024-08-22 ENCOUNTER — TELEPHONE (OUTPATIENT)
Dept: SURGERY | Facility: CLINIC | Age: 79
End: 2024-08-22

## 2024-08-22 NOTE — TELEPHONE ENCOUNTER
[facility] , Would like to know if patient should still continue taking Keppra was transferred to the facility on 8/16/24 , Deidra was seen @ ED on 8/13/24 for Spontaneous convexity SAH and acute left frontal SDH and chronic left convexity SDH without known trauma  Please call [phone]

## 2024-08-22 NOTE — TELEPHONE ENCOUNTER
For this patient, the Keppra was being managed by the Neurology service. According to their last note, the Keppra was supposed to be continued for 7 days and then stopped.

## 2024-08-22 NOTE — TELEPHONE ENCOUNTER
Message below noted.    Progress Note 8.14.24  \"Assessment:  78 yo male with SAH and anterior frontal hemorrhage     Dr Sosa at bedside to discuss the option of cerebral angiogram to definitively rule out vascular abnormalities as a source of bleed. After discussion, the patient and family elected to not do a cerebral angiogram     Plan:  No angiogram planned  Will follow up with out patient head CT in clinic  OK to transfer to step down  Q4 Neuro checks  Continue to hold Plavix until outpatient follow up  DVT prophylaxis SCD\"    Routed to Provider.

## 2024-08-22 NOTE — TELEPHONE ENCOUNTER
Nursing placed call to Laura from [facility], and was informed of APRN msg below, regarding kleppra managed by neurology.    Laura verbalized understanding, appreciative of call back.    Nothing further needed at this time, closing encounter.

## 2024-09-28 ENCOUNTER — TELEPHONE (OUTPATIENT)
Dept: SURGERY | Facility: CLINIC | Age: 79
End: 2024-09-28

## 2024-09-28 NOTE — TELEPHONE ENCOUNTER
Please reach out to patient. Was due for a follow up with me after Nora Sharpe with a head CT.   See if family is interested in follow up

## 2025-04-02 ENCOUNTER — HOSPITAL ENCOUNTER (EMERGENCY)
Facility: HOSPITAL | Age: 80
Discharge: HOME | End: 2025-04-02
Attending: EMERGENCY MEDICINE
Payer: MEDICAID

## 2025-04-02 ENCOUNTER — HOSPITAL ENCOUNTER (EMERGENCY)
Facility: HOSPITAL | Age: 80
Discharge: NURSING, REHAB OR LONG-TERM CARE | End: 2025-04-02
Attending: EMERGENCY MEDICINE
Payer: MEDICAID

## 2025-04-02 ENCOUNTER — APPOINTMENT (OUTPATIENT)
Dept: CT IMAGING | Facility: HOSPITAL | Age: 80
End: 2025-04-02
Attending: EMERGENCY MEDICINE
Payer: MEDICAID

## 2025-04-02 VITALS
RESPIRATION RATE: 15 BRPM | HEART RATE: 76 BPM | OXYGEN SATURATION: 100 % | SYSTOLIC BLOOD PRESSURE: 127 MMHG | TEMPERATURE: 98 F | DIASTOLIC BLOOD PRESSURE: 79 MMHG

## 2025-04-02 DIAGNOSIS — L03.031 CELLULITIS OF MIDDLE TOE, RIGHT: ICD-10-CM
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
## 2025-04-02 PROCEDURE — 70450 CT HEAD/BRAIN W/O DYE: CPT | Performed by: EMERGENCY MEDICINE

## 2025-04-02 PROCEDURE — 99284 EMERGENCY DEPT VISIT MOD MDM: CPT

## 2025-04-02 NOTE — ED PROVIDER NOTES
Patient Seen in: [facility] Emergency Department      History     Chief Complaint   Patient presents with    Fall     Stated Complaint:     Subjective:   HPI      80-year-old male presents to the emergency department after a fall at the nursing home.  He was sent in for evaluation of possible head injury.  Patient has a history of a subarachnoid hemorrhage as well as a subdural hematoma.  He is not on any anticoagulation.  Patient is primarily Turkish speaking but does answer questions for me and follows commands.  He denies any pain.  Patient recently started on Keflex for a toe infection no other acute complaints    Objective:     Past Medical History:    CVA (cerebral vascular accident) (HCC)    Diabetes (HCC)    Essential hypertension    High blood pressure    High cholesterol              Past Surgical History:   Procedure Laterality Date    Other surgical history      feeding tube                Social History     Socioeconomic History    Marital status:    Tobacco Use    Smoking status: Never    Smokeless tobacco: Never   Vaping Use    Vaping status: Never Used   Substance and Sexual Activity    Alcohol use: Never    Drug use: Never     Social Drivers of Health     Food Insecurity: Low Risk  (8/30/2024)    Received from [facility]    Food Insecurity     Have there been times that your food ran out, and you didn't have money to get more?: No     Are there times that you worry that this might happen?: No   Transportation Needs: Low Risk  (9/1/2024)    Received from [facility]    Transportation Needs     Has lack of transportation kept you from medical appointments, meetings, work, or from getting things needed for daily living: No   Housing Stability: Low Risk  (8/30/2024)    Received from [facility]    Housing Stability     Are you worried that your electric, gas, oil, or water might be shut off?: No     Are you concerned about  having a safe and reliable place to live?: No                  Physical Exam     ED Triage Vitals [04/02/25 1525]   /63   Pulse 75   Resp 18   Temp 97.8 °F (36.6 °C)   Temp src Oral   SpO2 100 %   O2 Device None (Room air)       Current Vitals:   Vital Signs  BP: 125/63  Pulse: 77  Resp: 13  Temp: 97.8 °F (36.6 °C)  Temp src: Oral  MAP (mmHg): 82    Oxygen Therapy  SpO2: 100 %  O2 Device: None (Room air)        Physical Exam  Vitals and nursing note reviewed.   Constitutional:       General: He is not in acute distress.     Appearance: Normal appearance. He is well-developed.   HENT:      Head: Normocephalic and atraumatic.      Mouth/Throat:      Mouth: Mucous membranes are moist.      Comments: No oral trauma  Eyes:      Extraocular Movements: Extraocular movements intact.   Neck:      Comments: Denies pain with palpation  Cardiovascular:      Rate and Rhythm: Normal rate and regular rhythm.      Pulses: Normal pulses.      Heart sounds: Normal heart sounds.   Pulmonary:      Effort: Pulmonary effort is normal.      Breath sounds: Normal breath sounds. No stridor.   Abdominal:      General: Bowel sounds are normal.      Palpations: Abdomen is soft.   Musculoskeletal:         General: No tenderness.      Cervical back: Normal range of motion and neck supple. No tenderness.      Comments: Patient has thick toenails and on his right middle toe the toenail is avulsed and there is some erythema of the toe but no streaking    Denies pain on palpation of his extremities   Lymphadenopathy:      Cervical: No cervical adenopathy.   Skin:     General: Skin is warm and dry.   Neurological:      General: No focal deficit present.      Mental Status: He is alert and oriented to person, place, and time.            ED Course   Labs Reviewed - No data to display         CT BRAIN OR HEAD (CPT=70450)    Result Date: 4/2/2025  PROCEDURE:  CT BRAIN OR HEAD (84613)  COMPARISON:  [facility] , CT, CT BRAIN OR HEAD (52297), 8/13/2024,  4:17 AM.  INDICATIONS:  fall, history of subarachnoid hemorrhage  TECHNIQUE:  Noncontrast CT scanning is performed through the brain. Dose reduction techniques were used. Dose information is transmitted to the ACR (American College of Radiology) NRDR (National Radiology Data Registry) which includes the Dose Index Registry.  PATIENT STATED HISTORY: (As transcribed by Technologist)  Patient had a fall.    FINDINGS:  VENTRICLES/SULCI:  Ventricles and sulci are normal in size.  INTRACRANIAL:  No significant midline shift or mass effect is seen.  No evidence of acute intracranial hemorrhage.  There is hypoattenuation present within the subcortical, deep and periventricular white matter which most likely represents mild to moderate chronic microvascular ischemic changes. SINUSES:           Mucous retention cysts are present within the left maxillary sinus and within the ethmoids. MASTOIDS:          No sign of acute inflammation. SKULL:             No evidence for fracture or osseous abnormality. OTHER:             None.            CONCLUSION:  No significant midline shift or mass effect.  No acute intracranial hemorrhage.  Moderate chronic microvascular ischemic changes are likely present within the white matter.  If there is persistent clinical concern then consider MRI.     LOCATION:  [id number]   Dictated by (CST): Jarod Lawrence MD on 4/02/2025 at 4:34 PM     Finalized by (CST): Jarod Lawrence MD on 4/02/2025 at 4:39 PM            MDM      Patient's daughter did arrive and I spoke further with her.  She states that podiatry is coming to see the patient but she does not know when and and was concerned about the toe.  We discussed that if there is progression despite the antibiotics that he may require further evaluation at that time as he is diabetic.  He had a CT scan of his head I reviewed the films and appreciate obvious acute bleed.  I reviewed radiology report they did not appreciate any shift or acute bleed either.  They  did note chronic microvascular changes that would be anticipated with his age.  At this time is not felt that patient requires any emergent inpatient admission and he was discharged to the nursing home        Medical Decision Making      Disposition and Plan     Clinical Impression:  1. Fall, initial encounter    2. Cellulitis of middle toe, right         Disposition:  Discharge  4/2/2025  5:32 pm    Follow-up:  Lisa Wetzel MD  [address] [address]  [phone]    Schedule an appointment as soon as possible for a visit            Medications Prescribed:  Current Discharge Medication List              Supplementary Documentation:

## 2025-04-02 NOTE — ED QUICK NOTES
An QUINTANA from [facility] called to report pt is taking Keflex 500mg Q6hr started 3/31/25 for the toe infection. Last dose was given at noon today.

## 2025-04-02 NOTE — ED INITIAL ASSESSMENT (HPI)
Patient from [facility] for a fall around 1230. Patient fell from standing, hit his head (back right of the head), -LOC no blood thinner use. Unrelated son is concerned about a toe wound, third toe of the right foot.